# Patient Record
Sex: MALE | Race: BLACK OR AFRICAN AMERICAN
[De-identification: names, ages, dates, MRNs, and addresses within clinical notes are randomized per-mention and may not be internally consistent; named-entity substitution may affect disease eponyms.]

---

## 2020-05-16 ENCOUNTER — HOSPITAL ENCOUNTER (INPATIENT)
Dept: HOSPITAL 72 - EMR | Age: 80
LOS: 6 days | Discharge: OTHER INSTITUTION | DRG: 871 | End: 2020-05-22
Payer: MEDICARE

## 2020-05-16 VITALS — DIASTOLIC BLOOD PRESSURE: 100 MMHG | SYSTOLIC BLOOD PRESSURE: 178 MMHG

## 2020-05-16 VITALS — HEIGHT: 69 IN | WEIGHT: 216.01 LBS | BODY MASS INDEX: 31.99 KG/M2

## 2020-05-16 VITALS — DIASTOLIC BLOOD PRESSURE: 88 MMHG | SYSTOLIC BLOOD PRESSURE: 160 MMHG

## 2020-05-16 DIAGNOSIS — J44.9: ICD-10-CM

## 2020-05-16 DIAGNOSIS — J12.89: ICD-10-CM

## 2020-05-16 DIAGNOSIS — I50.30: ICD-10-CM

## 2020-05-16 DIAGNOSIS — R13.10: ICD-10-CM

## 2020-05-16 DIAGNOSIS — I21.4: ICD-10-CM

## 2020-05-16 DIAGNOSIS — N39.0: ICD-10-CM

## 2020-05-16 DIAGNOSIS — R74.0: ICD-10-CM

## 2020-05-16 DIAGNOSIS — U07.1: ICD-10-CM

## 2020-05-16 DIAGNOSIS — R65.20: ICD-10-CM

## 2020-05-16 DIAGNOSIS — I16.0: ICD-10-CM

## 2020-05-16 DIAGNOSIS — I69.351: ICD-10-CM

## 2020-05-16 DIAGNOSIS — E78.5: ICD-10-CM

## 2020-05-16 DIAGNOSIS — Z79.82: ICD-10-CM

## 2020-05-16 DIAGNOSIS — I48.91: ICD-10-CM

## 2020-05-16 DIAGNOSIS — I27.20: ICD-10-CM

## 2020-05-16 DIAGNOSIS — Z66: ICD-10-CM

## 2020-05-16 DIAGNOSIS — D69.6: ICD-10-CM

## 2020-05-16 DIAGNOSIS — I13.0: ICD-10-CM

## 2020-05-16 DIAGNOSIS — N17.9: ICD-10-CM

## 2020-05-16 DIAGNOSIS — B96.89: ICD-10-CM

## 2020-05-16 DIAGNOSIS — J96.01: ICD-10-CM

## 2020-05-16 DIAGNOSIS — N18.9: ICD-10-CM

## 2020-05-16 DIAGNOSIS — A41.89: Primary | ICD-10-CM

## 2020-05-16 LAB
ADD MANUAL DIFF: NO
ALBUMIN SERPL-MCNC: 2.8 G/DL (ref 3.4–5)
ALBUMIN/GLOB SERPL: 0.6 {RATIO} (ref 1–2.7)
ALP SERPL-CCNC: 106 U/L (ref 46–116)
ALT SERPL-CCNC: 50 U/L (ref 12–78)
ANION GAP SERPL CALC-SCNC: 13 MMOL/L (ref 5–15)
APPEARANCE UR: (no result)
APTT BLD: 36 SEC (ref 23–33)
APTT PPP: YELLOW S
AST SERPL-CCNC: 60 U/L (ref 15–37)
BASOPHILS NFR BLD AUTO: 0.6 % (ref 0–2)
BILIRUB DIRECT SERPL-MCNC: 1.1 MG/DL (ref 0–0.3)
BILIRUB SERPL-MCNC: 1.9 MG/DL (ref 0.2–1)
BUN SERPL-MCNC: 48 MG/DL (ref 7–18)
CALCIUM SERPL-MCNC: 8.6 MG/DL (ref 8.5–10.1)
CHLORIDE SERPL-SCNC: 108 MMOL/L (ref 98–107)
CK SERPL-CCNC: 139 U/L (ref 26–308)
CO2 SERPL-SCNC: 25 MMOL/L (ref 21–32)
CREAT SERPL-MCNC: 2 MG/DL (ref 0.55–1.3)
EOSINOPHIL NFR BLD AUTO: 0 % (ref 0–3)
ERYTHROCYTE [DISTWIDTH] IN BLOOD BY AUTOMATED COUNT: 12.6 % (ref 11.6–14.8)
GLOBULIN SER-MCNC: 4.7 G/DL
GLUCOSE UR STRIP-MCNC: NEGATIVE MG/DL
HCT VFR BLD CALC: 42.1 % (ref 42–52)
HGB BLD-MCNC: 13.6 G/DL (ref 14.2–18)
INR PPP: 1.2 (ref 0.9–1.1)
KETONES UR QL STRIP: NEGATIVE
LDH SERPL L TO P-CCNC: 458 U/L (ref 81–234)
LEUKOCYTE ESTERASE UR QL STRIP: (no result)
LYMPHOCYTES NFR BLD AUTO: 10.3 % (ref 20–45)
MCV RBC AUTO: 99 FL (ref 80–99)
MONOCYTES NFR BLD AUTO: 7.7 % (ref 1–10)
NEUTROPHILS NFR BLD AUTO: 81.5 % (ref 45–75)
NITRITE UR QL STRIP: NEGATIVE
PH UR STRIP: 5 [PH] (ref 4.5–8)
PLATELET # BLD: 124 K/UL (ref 150–450)
POTASSIUM SERPL-SCNC: 3.9 MMOL/L (ref 3.5–5.1)
PROT UR QL STRIP: (no result)
RBC # BLD AUTO: 4.26 M/UL (ref 4.7–6.1)
SODIUM SERPL-SCNC: 146 MMOL/L (ref 136–145)
SP GR UR STRIP: 1.01 (ref 1–1.03)
UROBILINOGEN UR-MCNC: 1 MG/DL (ref 0–1)
WBC # BLD AUTO: 6.3 K/UL (ref 4.8–10.8)

## 2020-05-16 PROCEDURE — 84550 ASSAY OF BLOOD/URIC ACID: CPT

## 2020-05-16 PROCEDURE — 80053 COMPREHEN METABOLIC PANEL: CPT

## 2020-05-16 PROCEDURE — 84100 ASSAY OF PHOSPHORUS: CPT

## 2020-05-16 PROCEDURE — 85025 COMPLETE CBC W/AUTO DIFF WBC: CPT

## 2020-05-16 PROCEDURE — 87086 URINE CULTURE/COLONY COUNT: CPT

## 2020-05-16 PROCEDURE — 96361 HYDRATE IV INFUSION ADD-ON: CPT

## 2020-05-16 PROCEDURE — 96367 TX/PROPH/DG ADDL SEQ IV INF: CPT

## 2020-05-16 PROCEDURE — 84443 ASSAY THYROID STIM HORMONE: CPT

## 2020-05-16 PROCEDURE — 99285 EMERGENCY DEPT VISIT HI MDM: CPT

## 2020-05-16 PROCEDURE — 85730 THROMBOPLASTIN TIME PARTIAL: CPT

## 2020-05-16 PROCEDURE — 83735 ASSAY OF MAGNESIUM: CPT

## 2020-05-16 PROCEDURE — 85379 FIBRIN DEGRADATION QUANT: CPT

## 2020-05-16 PROCEDURE — 85610 PROTHROMBIN TIME: CPT

## 2020-05-16 PROCEDURE — 87040 BLOOD CULTURE FOR BACTERIA: CPT

## 2020-05-16 PROCEDURE — 86140 C-REACTIVE PROTEIN: CPT

## 2020-05-16 PROCEDURE — 83615 LACTATE (LD) (LDH) ENZYME: CPT

## 2020-05-16 PROCEDURE — 82607 VITAMIN B-12: CPT

## 2020-05-16 PROCEDURE — 82728 ASSAY OF FERRITIN: CPT

## 2020-05-16 PROCEDURE — 96365 THER/PROPH/DIAG IV INF INIT: CPT

## 2020-05-16 PROCEDURE — 82248 BILIRUBIN DIRECT: CPT

## 2020-05-16 PROCEDURE — 83605 ASSAY OF LACTIC ACID: CPT

## 2020-05-16 PROCEDURE — 83880 ASSAY OF NATRIURETIC PEPTIDE: CPT

## 2020-05-16 PROCEDURE — 83550 IRON BINDING TEST: CPT

## 2020-05-16 PROCEDURE — 36415 COLL VENOUS BLD VENIPUNCTURE: CPT

## 2020-05-16 PROCEDURE — 80048 BASIC METABOLIC PNL TOTAL CA: CPT

## 2020-05-16 PROCEDURE — 93306 TTE W/DOPPLER COMPLETE: CPT

## 2020-05-16 PROCEDURE — 84484 ASSAY OF TROPONIN QUANT: CPT

## 2020-05-16 PROCEDURE — 80061 LIPID PANEL: CPT

## 2020-05-16 PROCEDURE — 83540 ASSAY OF IRON: CPT

## 2020-05-16 PROCEDURE — 83690 ASSAY OF LIPASE: CPT

## 2020-05-16 PROCEDURE — 82746 ASSAY OF FOLIC ACID SERUM: CPT

## 2020-05-16 PROCEDURE — 82550 ASSAY OF CK (CPK): CPT

## 2020-05-16 PROCEDURE — 80162 ASSAY OF DIGOXIN TOTAL: CPT

## 2020-05-16 PROCEDURE — 87181 SC STD AGAR DILUTION PER AGT: CPT

## 2020-05-16 PROCEDURE — 81003 URINALYSIS AUTO W/O SCOPE: CPT

## 2020-05-16 PROCEDURE — 71045 X-RAY EXAM CHEST 1 VIEW: CPT

## 2020-05-16 PROCEDURE — 87635 SARS-COV-2 COVID-19 AMP PRB: CPT

## 2020-05-16 PROCEDURE — 84300 ASSAY OF URINE SODIUM: CPT

## 2020-05-16 PROCEDURE — 82977 ASSAY OF GGT: CPT

## 2020-05-16 PROCEDURE — 83036 HEMOGLOBIN GLYCOSYLATED A1C: CPT

## 2020-05-16 PROCEDURE — 93005 ELECTROCARDIOGRAM TRACING: CPT

## 2020-05-16 PROCEDURE — 87081 CULTURE SCREEN ONLY: CPT

## 2020-05-16 NOTE — NUR
ED Nurse Note:



IV line established, blood drawn and sent to lab. Urine also obtained and sent 
to lab.

## 2020-05-16 NOTE — EMERGENCY ROOM REPORT
History of Present Illness


General


Chief Complaint:  Altered Mental Status


Source:  Patient, EMS





Present Illness


HPI


Patient presents from skilled nursing facility with a cough and shortness of 

breath.  He has a history of COPD and pneumonia in the past.  He denies any 

chest pain but says he is short of breath.  He was transported by S.  The 

patient is on oxygen at this time.  He denies having cough but is heard to be 

coughing.  He has had some chills according to him.  There is no documented 

fever.  The patient's had admissions in the past for pneumonia and COPD.  

Unknown of his COVID-19 status at this time.





No chills, sore throat, chest pain, palpitations, nausea, vomiting, diarrhea, 

dysuria, abdominal pain, joint pain, rashes, visual changes, dizziness, 

headache.


Allergies:  


Coded Allergies:  


     No Known Allergies (Unverified , 5/16/20)





COVID-19 Screening


Contact w/high risk pt:  Yes


Recent Travel to affected area:  No


Experienced COVID-19 symptoms?:  Yes


COVID-19 symptoms experienced:  Fever (T>100.4F or >38C), Cough


COVID-19 Testing performed PTA:  No


COVID-19 Screening:  [facility] COVID-19





Patient History


Past Medical History:  see triage record


Social History:  Denies: smoking - prior


Social History Narrative


[facility]


Reviewed Nursing Documentation:  PMH: Agreed; PSxH: Agreed





Nursing Documentation-PMH


Hx Hypertension:  Yes - HDL, dysphagia


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





Review of Systems


All Other Systems:  negative except mentioned in HPI





Physical Exam





Vital Signs








  Date Time  Temp Pulse Resp B/P (MAP) Pulse Ox O2 Delivery O2 Flow Rate FiO2


 


5/16/20 20:59  90 40 178/100 (126) 97 Nasal Cannula 4.0 








Sp02 EP Interpretation:  reviewed, abnormal - Interpreted as low by me


General Appearance:  alert, Chronically Ill


Eyes:  bilateral eye normal inspection, bilateral eye PERRL, bilateral eye 

other - Bilaterally


ENT:  moist mucus membranes


Neck:  supple


Respiratory:  no respiratory distress, rhonchi, other - Tachypnea


Cardiovascular #1:  regular rate, rhythm, no edema


Cardiovascular #2:  2+ radial (L)


Gastrointestinal:  non tender, soft


Genitourinary:  no CVA tenderness


Musculoskeletal:  decreased range of motion - Right hand, swelling - Bilateral 

feet, other - Right hand contractures


Neurologic:  alert, motor weakness, Babinski - Right hemiparesis hemiparesis 

bilateral


Psychiatric:  mood/affect normal


Skin:  no rash





Medical Decision Making


Diagnostic Impression:  


 Primary Impression:  


 Right lower lobe pneumonia


 Qualified Codes:  J18.9 - Pneumonia, unspecified organism


 Additional Impressions:  


 Suspected COVID-19 virus infection


 Hypoxia


 Renal insufficiency


 NSTEMI (non-ST elevated myocardial infarction)


ER Course


Patient presents with fever, cough and tachypnea.  Differential includes COVID-

19, pneumonia, exacerbation COPD, acute myocardial infarction amongst others.  

Evaluation with EKG, chest x-ray and labs including septic work-up.  Treatment 

with IV hydration, oxygen.  Respiratory isolation instituted.





EKG with LVH and QT prolongation.  Chest x-ray right lower lobe infiltrate.  

White count normal.  Left shift.  Renal insufficiency.  Elevated CRP, ferritin, 

LDH.  Normal lactic acid





Based on chest x-ray antibiotics (triple) begun.  Tylenol administered for 

fever.





Chest x-ray more consistent with lobar pneumonia.  However concern over 

elevated ferritin, CRP and LDH which suggest possible COVID-19.





Patient improved on oxygen and with IV hydration.





Discussed with Dr. Beckman, [place], states does not want patient transferred.

  Contact Dr. Brown and Dr. Manzano for admission.





Troponin not discussed with ER physician.





Patient improved and admitted to the hospital.





Laboratory Tests








Test


  5/16/20


21:25 5/16/20


21:40


 


White Blood Count


  6.3 K/UL


(4.8-10.8) 


 


 


Red Blood Count


  4.26 M/UL


(4.70-6.10)  L 


 


 


Hemoglobin


  13.6 G/DL


(14.2-18.0)  L 


 


 


Hematocrit


  42.1 %


(42.0-52.0) 


 


 


Mean Corpuscular Volume 99 FL (80-99)   


 


Mean Corpuscular Hemoglobin


  31.9 PG


(27.0-31.0)  H 


 


 


Mean Corpuscular Hemoglobin


Concent 32.3 G/DL


(32.0-36.0) 


 


 


Red Cell Distribution Width


  12.6 %


(11.6-14.8) 


 


 


Platelet Count


  124 K/UL


(150-450)  L 


 


 


Mean Platelet Volume


  7.8 FL


(6.5-10.1) 


 


 


Neutrophils (%) (Auto)


  81.5 %


(45.0-75.0)  H 


 


 


Lymphocytes (%) (Auto)


  10.3 %


(20.0-45.0)  L 


 


 


Monocytes (%) (Auto)


  7.7 %


(1.0-10.0) 


 


 


Eosinophils (%) (Auto)


  0.0 %


(0.0-3.0) 


 


 


Basophils (%) (Auto)


  0.6 %


(0.0-2.0) 


 


 


Prothrombin Time


  12.7 SEC


(9.30-11.50)  H 


 


 


Prothrombin Time INR


  1.2 (0.9-1.1)


H 


 


 


Activated Partial


Thromboplast Time 36 SEC (23-33)


H 


 


 


D-Dimer


  2.57 mg/L FEU


(0.00-0.49)  H 


 


 


Sodium Level


  146 MMOL/L


(136-145)  H 


 


 


Potassium Level


  3.9 MMOL/L


(3.5-5.1) 


 


 


Chloride Level


  108 MMOL/L


()  H 


 


 


Carbon Dioxide Level


  25 MMOL/L


(21-32) 


 


 


Anion Gap


  13 mmol/L


(5-15) 


 


 


Blood Urea Nitrogen


  48 mg/dL


(7-18)  H 


 


 


Creatinine


  2.0 MG/DL


(0.55-1.30)  H 


 


 


Estimated Glomerular


Filtration Rate 39.1 mL/min


(>60) 


 


 


Glucose Level


  100 MG/DL


() 


 


 


Lactic Acid Level


  0.90 mmol/L


(0.4-2.0) 


 


 


Calcium Level


  8.6 MG/DL


(8.5-10.1) 


 


 


Magnesium Level


  2.2 MG/DL


(1.8-2.4) 


 


 


Ferritin


  1398 NG/ML


(8-388)  H 


 


 


Total Bilirubin


  1.9 MG/DL


(0.2-1.0)  H 


 


 


Direct Bilirubin


  1.1 MG/DL


(0.0-0.3)  H 


 


 


Aspartate Amino Transferase


(AST) 60 U/L (15-37)


H 


 


 


Alanine Aminotransferase (ALT)


  50 U/L (12-78)


  


 


 


Alkaline Phosphatase


  106 U/L


() 


 


 


Lactate Dehydrogenase


  458 U/L


()  H 


 


 


Total Creatine Kinase


  139 U/L


() 


 


 


Troponin I


  0.151 ng/mL


(0.000-0.056) 


 


 


C-Reactive Protein,


Quantitative 27.8 mg/dL


(0.00-0.90)  H 


 


 


Pro-B-Type Natriuretic Peptide


  4086 pg/mL


(0-125)  H 


 


 


Total Protein


  7.5 G/DL


(6.4-8.2) 


 


 


Albumin


  2.8 G/DL


(3.4-5.0)  L 


 


 


Globulin 4.7 g/dL   


 


Albumin/Globulin Ratio


  0.6 (1.0-2.7)


L 


 


 


Lipase


  256 U/L


() 


 


 


Urine Color  Yellow  


 


Urine Appearance  Cloudy  


 


Urine pH  5 (4.5-8.0)  


 


Urine Specific Gravity


  


  1.015


(1.005-1.035)


 


Urine Protein


  


  3+ (NEGATIVE)


H


 


Urine Glucose (UA)


  


  Negative


(NEGATIVE)


 


Urine Ketones


  


  Negative


(NEGATIVE)


 


Urine Blood


  


  3+ (NEGATIVE)


H


 


Urine Nitrite


  


  Negative


(NEGATIVE)


 


Urine Bilirubin


  


  Negative


(NEGATIVE)


 


Urine Urobilinogen


  


  1 MG/DL


(0.0-1.0)  H


 


Urine Leukocyte Esterase


  


  2+ (NEGATIVE)


H


 


Urine RBC


  


  40-60 /HPF (0


- 0)  H


 


Urine WBC


  


  30-40 /HPF (0


- 0)  H


 


Urine Squamous Epithelial


Cells 


  Few /LPF


(NONE/OCC)


 


Urine Amorphous Sediment


  


  Many /LPF


(NONE)  H


 


Urine Bacteria


  


  Many /HPF


(NONE)  H








EKG Diagnostic Results


Rate:  normal


Rhythm:  NSR


ST Segments:  no acute changes - LVH and QT prolongation





Rhythm Strip Diag. Results


EP Interpretation:  yes


Rhythm:  NSR, no PVC's, no ectopy





Chest X-Ray Diagnostic Results


Chest X-Ray Diagnostic Results :  


   Chest X-Ray Ordered:  Yes


   # of Views/Limited/Complete:  1 View


   Indication:  Shortness of Breath


   EP Interpretation:  Yes


   Interpretation:  no effusion, no pneumothorax, other - CXR


   Impression:  Other


   Electronically Signed by:  Electronically signed by Kishore Breaux MD





Last Vital Signs








  Date Time  Temp Pulse Resp B/P (MAP) Pulse Ox O2 Delivery O2 Flow Rate FiO2


 


5/17/20 00:30 102.1 78 27 146/74 99 Nasal Cannula 4.0 








Status:  improved


Disposition:  ADMITTED AS INPATIENT


Condition:  Serious


Referrals:  


Carlo Manzano DO (PCP)











Kishore Breaux MD May 16, 2020 22:09

## 2020-05-16 NOTE — NUR
ED Nurse Note:



Pt is sleeping in bed at this time. No acute distress. Pt respirations have 
decreased to 24.

## 2020-05-16 NOTE — NUR
ED Nurse Note:



Pt brought into ED by [facility] ambulance unit 240 from [facility] 
for c/o increased lethargy. Per EMS, pt has been more lethargic than normal and 
this was noticed by staff yesterday. Pt is awake and alert, slow to speak but 
able to answer simple questions. Pt is tachypneic at this time with 
respirations in the 30's. Pt is on 4L oxygen via [place]. Pt is hot to the touch and 
fever noted upon triage. Productive cough also noted upon triage. Pt connected 
to cardiac monitor, ERMD bedside. Safety measures in place, will continue to 
monitor.

## 2020-05-16 NOTE — DIAGNOSTIC IMAGING REPORT
EXAM:

  XR Chest, 1 View

 

CLINICAL HISTORY:

  COUGH

 

TECHNIQUE:

  Frontal view of the chest.

 

COMPARISON:

  No relevant prior studies available.

 

FINDINGS:

  Lungs:  There is consolidation involving the right lower lung 

consistent with pneumonia.  Linear atelectasis is identified at the left 

lung base.

  Pleural space:  Unremarkable.  No pneumothorax.

  Heart:  Cardiac size is enlarged.

  Mediastinum:  Unremarkable.

  Bones/joints:  There are degenerative arthritic changes of the right 

shoulder joint.

  Vasculature:  The thoracic aorta is calcified and ectatic.

 

IMPRESSION:     

1.  Right lower lung pneumonia.

2.  Cardiomegaly.

## 2020-05-17 VITALS — SYSTOLIC BLOOD PRESSURE: 149 MMHG | DIASTOLIC BLOOD PRESSURE: 77 MMHG

## 2020-05-17 VITALS — SYSTOLIC BLOOD PRESSURE: 151 MMHG | DIASTOLIC BLOOD PRESSURE: 68 MMHG

## 2020-05-17 VITALS — DIASTOLIC BLOOD PRESSURE: 72 MMHG | SYSTOLIC BLOOD PRESSURE: 149 MMHG

## 2020-05-17 VITALS — DIASTOLIC BLOOD PRESSURE: 80 MMHG | SYSTOLIC BLOOD PRESSURE: 155 MMHG

## 2020-05-17 VITALS — SYSTOLIC BLOOD PRESSURE: 151 MMHG | DIASTOLIC BLOOD PRESSURE: 82 MMHG

## 2020-05-17 VITALS — SYSTOLIC BLOOD PRESSURE: 157 MMHG | DIASTOLIC BLOOD PRESSURE: 86 MMHG

## 2020-05-17 VITALS — DIASTOLIC BLOOD PRESSURE: 78 MMHG | SYSTOLIC BLOOD PRESSURE: 151 MMHG

## 2020-05-17 LAB
ADD MANUAL DIFF: NO
ALBUMIN SERPL-MCNC: 2.6 G/DL (ref 3.4–5)
ALBUMIN/GLOB SERPL: 0.6 {RATIO} (ref 1–2.7)
ALP SERPL-CCNC: 108 U/L (ref 46–116)
ALT SERPL-CCNC: 59 U/L (ref 12–78)
ANION GAP SERPL CALC-SCNC: 12 MMOL/L (ref 5–15)
AST SERPL-CCNC: 80 U/L (ref 15–37)
BASOPHILS NFR BLD AUTO: 0.4 % (ref 0–2)
BILIRUB DIRECT SERPL-MCNC: 1.1 MG/DL (ref 0–0.3)
BILIRUB SERPL-MCNC: 1.8 MG/DL (ref 0.2–1)
BUN SERPL-MCNC: 46 MG/DL (ref 7–18)
CALCIUM SERPL-MCNC: 8.6 MG/DL (ref 8.5–10.1)
CHLORIDE SERPL-SCNC: 110 MMOL/L (ref 98–107)
CO2 SERPL-SCNC: 25 MMOL/L (ref 21–32)
CREAT SERPL-MCNC: 1.9 MG/DL (ref 0.55–1.3)
EOSINOPHIL NFR BLD AUTO: 0 % (ref 0–3)
ERYTHROCYTE [DISTWIDTH] IN BLOOD BY AUTOMATED COUNT: 11.3 % (ref 11.6–14.8)
GLOBULIN SER-MCNC: 4.6 G/DL
HCT VFR BLD CALC: 42.6 % (ref 42–52)
HGB BLD-MCNC: 14.8 G/DL (ref 14.2–18)
LYMPHOCYTES NFR BLD AUTO: 9.2 % (ref 20–45)
MCV RBC AUTO: 92 FL (ref 80–99)
MONOCYTES NFR BLD AUTO: 6.6 % (ref 1–10)
NEUTROPHILS NFR BLD AUTO: 83.8 % (ref 45–75)
PHOSPHATE SERPL-MCNC: 3.3 MG/DL (ref 2.5–4.9)
PLATELET # BLD: 121 K/UL (ref 150–450)
POTASSIUM SERPL-SCNC: 3.7 MMOL/L (ref 3.5–5.1)
RBC # BLD AUTO: 4.62 M/UL (ref 4.7–6.1)
SODIUM SERPL-SCNC: 147 MMOL/L (ref 136–145)
WBC # BLD AUTO: 5.7 K/UL (ref 4.8–10.8)

## 2020-05-17 RX ADMIN — DOCUSATE SODIUM SCH MG: 250 CAPSULE, LIQUID FILLED ORAL at 09:19

## 2020-05-17 RX ADMIN — HYDRALAZINE HYDROCHLORIDE SCH MG: 25 TABLET ORAL at 14:25

## 2020-05-17 RX ADMIN — SORBITOL SOLUTION (BULK) SCH ML: 70 SOLUTION at 17:35

## 2020-05-17 RX ADMIN — DILTIAZEM HYDROCHLORIDE SCH MG: 180 CAPSULE, EXTENDED RELEASE ORAL at 09:19

## 2020-05-17 RX ADMIN — DEXTROSE, SODIUM CHLORIDE, AND POTASSIUM CHLORIDE SCH MLS/HR: 5; .45; .15 INJECTION INTRAVENOUS at 14:24

## 2020-05-17 RX ADMIN — Medication SCH EA: at 09:19

## 2020-05-17 RX ADMIN — DIGOXIN SCH MG: 0.12 TABLET ORAL at 09:19

## 2020-05-17 RX ADMIN — ASPIRIN SCH MG: 81 TABLET, DELAYED RELEASE ORAL at 09:19

## 2020-05-17 RX ADMIN — HYDRALAZINE HYDROCHLORIDE SCH MG: 25 TABLET ORAL at 06:23

## 2020-05-17 RX ADMIN — SORBITOL SOLUTION (BULK) SCH ML: 70 SOLUTION at 12:21

## 2020-05-17 RX ADMIN — DEXTROSE, SODIUM CHLORIDE, AND POTASSIUM CHLORIDE SCH MLS/HR: 5; .45; .15 INJECTION INTRAVENOUS at 03:35

## 2020-05-17 RX ADMIN — ATORVASTATIN CALCIUM SCH MG: 20 TABLET, FILM COATED ORAL at 23:29

## 2020-05-17 RX ADMIN — AZITHROMYCIN DIHYDRATE SCH MLS/HR: 500 INJECTION, POWDER, LYOPHILIZED, FOR SOLUTION INTRAVENOUS at 21:00

## 2020-05-17 RX ADMIN — DOCUSATE SODIUM SCH MG: 250 CAPSULE, LIQUID FILLED ORAL at 17:35

## 2020-05-17 RX ADMIN — HYDRALAZINE HYDROCHLORIDE SCH MG: 25 TABLET ORAL at 23:28

## 2020-05-17 NOTE — NUR
NURSE NOTES:

Asked AURE Bergman if it is okay to administer Lovenox because of low platelet count of 124. 
Per AURE Bergman, it is okay to administer.

## 2020-05-17 NOTE — CONSULTATION
DATE OF CONSULTATION:  05/17/2020

CARDIOLOGY CONSULTATION



CONSULTING PHYSICIAN:  Jake Lan MD.



REFERRING PHYSICIAN:  Urban Brown MD.



REASON FOR CONSULTATION:  Accelerated hypertension, shortness of breath,

and atrial fibrillation.



HISTORY OF PRESENT ILLNESS:  The patient is an 80-year-old gentleman with

history of hypertension, COPD, and history of CVA in the past was brought

by paramedics for cough and shortness of breath.  The patient has history

of COPD and pneumonia in the past.  The patient also had some chills.  The

patient was admitted and be ruled out for COVID pneumonia.  At the time of

my evaluation, the patient is comfortable and is on oxygen.



REVIEW OF SYSTEMS:  Review of systems was negative other than what is

mentioned in the history of present illness.



PAST MEDICAL HISTORY:  As mentioned above.



FAMILY HISTORY:  Noncontributory.



SOCIAL HISTORY:  There is no history of smoking or drug use.



PHYSICAL EXAMINATION:

VITAL SIGNS:  Blood pressure is 149/77, pulse 70, respirations 18, and he

is afebrile.

HEAD AND NECK:  Showed no JVD.

LUNGS:  Coarse rhonchi.

CARDIOVASCULAR:  Regular S1 and S2 with no gallop.

ABDOMEN:  Soft.

EXTREMITIES:  No pitting edema.



LABORATORY DATA:  Labs show white count 5.7, hemoglobin 14.9, hematocrit

42.6, and platelet count 121.  Sodium 147, potassium 3.7, BUN of 43,

creatinine 1.9, and glucose of 147.  Troponin is 0.15 and 0.18.



ASSESSMENT AND PLAN:

1. Non-ST elevation myocardial infarction by elevated troponin.

However, this could be due to renal failure, creatinine is 2.  The EKG

showed atrial fibrillation, occasional PVC. The patient on aspirin and

Lipitor, add metoprolol to his medical regimen.  The patient is also on

Imdur.  Repeat EKG and echocardiogram for further evaluation.

2. Atrial fibrillation.  The rate is currently controlled on Cardizem CD

180 mg daily.  Currently, patient is only on aspirin and low-dose

subcutaneous Lovenox.  Most likely would need a full anticoagulation that

would be discussed with Dr. Brown.  The patient is also on digoxin as

well.

3. Hypertension, stable on our Cardizem.

4. COPD on albuterol.

5. Occasional PVCs.  Echocardiogram shows ejection fraction of 65%.  I

will repeat troponin in the morning as well.

6. Rule out COVID pneumonia.

7. Renal failure, creatinine of 2.



Thank you very much for allowing me to participate in the care of this

patient.  Please do not hesitate to contact me for any questions regarding

my evaluation.



Sincerely,









  ______________________________________________

  Jake Lan M.D.





DR:  Tabitha

D:  05/17/2020 14:11

T:  05/17/2020 19:13

JOB#:  [id number]

CC:

## 2020-05-17 NOTE — NUR
NURSE NOTES:

Received report from LISA Alston. Patient transferred from E. via [facility] to room 216-2 on 
Covid precaution. Patient is awake, alert and oriented x 2. Oriented to room and telemetry 
unit. On nasal cannula @ 4Lpm with no shortness or difficulty of breathing reported, 
saturating 94%. On low sodium diet. Cardiac monitor is in placed, shows Atrial fibrillation. 
Patient is bedbound. IV site is on left forearm g-20 saline lock that is patent and intact. 
Safety measures are in placed, bed in lowest and lock position. Side rails up x 2. Will 
continue plan of care,

## 2020-05-17 NOTE — HISTORY & PHYSICAL
History and Physical


History & Physicial


Job # [id number]











Urban Brown MD May 17, 2020 13:19

## 2020-05-17 NOTE — CONSULTATION
Consult Note


Consult Note





I am asked to evaluate the the patient at the request of Dr. Brown for renal 

failure





Patient presents from [facility] nursing facility with a cough and shortness of 

breath.  He has a history of COPD and pneumonia in the past.  He denies any 

chest pain but says he is short of breath.  He was transported by S.  The 

patient is on oxygen at this time.  He denies having cough but is heard to be 

coughing.  He has had some chills according to him.  There is no documented 

fever.  The patient's had admissions in the past for pneumonia and COPD.  

Unknown of his COVID-19 status at this time.





No chills, sore throat, chest pain, palpitations, nausea, vomiting, diarrhea, 

dysuria, abdominal pain, joint pain, rashes, visual changes, dizziness, 

headache.





     No Known Allergies (Unverified , 5/16/20)





COVID-19 Screening


Contact w/high risk pt:  Yes


Recent Travel to affected area:  No


Experienced COVID-19 symptoms?:  Yes


COVID-19 symptoms experienced:  Fever (T>100.4F or >38C), Cough


COVID-19 Testing performed PTA:  No


COVID-19 Screening:  [facility] COVID-19








Hx Hypertension:  Yes - HDL, dysphagia


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





Patient seen, examined


Data reviewed


No prior hospitalization here at [facility]


Assessment/Plan





Acute renal failure most likely superimposed on underlying chronic kidney 

disease


Acute hypoxic respiratory failure, pneumonia, suspected COVID-19


Sepsis


Elevated troponin


Suspected congestive heart failure


Hypertension, hypertension urgency on admission


Evidence of UTI








Suggestions:


Slow hydration


Watch for CHF symptoms


Pulmonary toilet


Antibiotics


Urine studies


Keep the blood pressure and blood sugar in check


Monitor renal parameters


Per orders


Per consultants











Shane Mello MD May 17, 2020 11:00

## 2020-05-17 NOTE — NUR
HAND-OFF: 

Report given to LISA Matos. Patient is asleep, on stable condition. Plan of care endorsed.

## 2020-05-17 NOTE — CONSULTATION
History of Present Illness


General


Date patient seen:  May 17, 2020


Time patient seen:  06:30


Chief Complaint:  Altered Mental Status


Referring physician:  Dr Brown


Reason for Consultation:  PNA





Present Illness


HPI


80 years old male, resident of skilled nursing facility, with past medical 

history of COPD, hypertension, CVA with right-sided hemiparesis, prior pneumonia

, DNR/DNI status, was sent for evaluation due to shortness of breath, cough and 

chills.


No fever was reported.


Upon evaluation patient was febrile with  temperature 102 and hypoxic requiring 

high flow of supplemental oxygen.


Blood pressure was elevated 178/100, and patient was tachypneic with 

respiratory rate of 40.


Laboratory work-up revealed no leukocytosis, stable hemoglobin and hematocrit.  


Platelet count 124.  


Troponin elevated 0.151.  pro BNP 4086.  EKG revealed   sinus rhythm, no acute 

ischemic changes.


Lactic acid 0.9.


BUN 42, creatinine 2.0.


Sodium 146.


Total bilirubin 1.9, direct bilirubin 1.1.  AST 60.


Urinalysis revealed pyuria, many bacteria, +3 protein, +2 leukocyte esterase.  


Noted elevated CRP 27.8 , , ferritin 1398, d-dimer 2.57 , thus  all 

inflammatory markers  elevated suggestive of possible cytokine storm.  


Patient was tested for COVID-19. 


Chest x-ray demonstrated right lower lung pneumonia.  Cardiomegaly.  


In emergency department patient pancultured ,started on empiric antibiotics,

received analgesic for fever.


Patient subsequently admitted to telemetry floor to isolation room for further 

management.


Allergies:  


Coded Allergies:  


     No Known Allergies (Unverified , 5/16/20)





Medication History


Scheduled


Acetaminophen (Acetaminophen), 325 MG ORAL Q4H, (Reported)


Amino Acids/Protein Hydrolys (Pro-Stat Liquid), 30 ML ORAL TWICE A DAY, (

Reported)


Aspirin* (Aspir 81*), 81 MG ORAL DAILY, (Reported)


Atorvastatin Calcium* (Lipitor*), 20 MG ORAL BEDTIME, (Reported)


Clonidine Hcl* (Catapres*), 0.1 MG ORAL EVERY 4 HOURS, (Reported)


Digoxin* (Digoxin*), 0.125 MG ORAL DAILY, (Reported)


Diltiazem Hcl* (Cardizem*), 180 MG ORAL DAILY, (Reported)


Docusate Sodium* (Docusate Sodium*), 250 MG ORAL TWICE A DAY, (Reported)


Hydralazine Hcl* (Hydralazine Hcl*), 25 MG ORAL EVERY 8 HOURS, (Reported)


Isosorbide Dinitrate* (Isordil*), 30 MG ORAL EVERY 6 HOURS, (Reported)


Multivitamin With Minerals (Multivitamins With Minerals*), 1 TAB ORAL DAILY, (

Reported)


Sorbitol Solution (Sorbitol), 30 ML ORAL EVERY 6 HOURS, (Reported)





Miscellaneous Medications


Bisacodyl (Dulcolax), 10 MG RC, (Reported)


Chlorhexidine Gluconate (Peridex), 10 ML MM, (Reported)


Dextran 70/Hypromellose (Artificial Tears Eye Drops*), 1 DROP BOTH EYES, (

Reported)


Mineral Oil (Mineral Oil Enema), 133 ML RC, (Reported)





Patient History


History Provided By:  Patient, Medical Record


Healthcare decision maker





Resuscitation status





Advanced Directive on File








Review of Systems


Constitutional:  Reports: weakness


Respiratory:  Reports: see HPI, cough


Cardiovascular:  Reports: no symptoms


Musculoskeletal:  Reports: joint pain


Neurological:  Reports: other - hx of CVA


ROS Narrative


ROS  limited due to patient medical conditiion





Physical Exam


General Appearance:  no apparent distress, other - awake, somewhat responsive 

AA male in NAD


Lines, tubes and drains:  peripheral


HEENT:  normocephalic, atraumatic, anicteric, mucous membranes moist


Neck:  supple


Respiratory/Chest:  no accessory muscle use, rhonchi  - left - few, rhonchi - 

right - diffused


Cardiovascular/Chest:  normal rate


Abdomen:  normal bowel sounds, non tender, soft


Extremities:  no calf tenderness, no edema


Neurologic:  abnormal gait, alert - somewhat responsive, , other - R side 

weakness


Musculoskeletal:  atrophy - BLE





Last 24 Hour Vital Signs








  Date Time  Temp Pulse Resp B/P (MAP) Pulse Ox O2 Delivery O2 Flow Rate FiO2


 


5/17/20 06:23    158/85    


 


5/17/20 06:23    158/88    


 


5/17/20 04:00 99.1 74 21 151/82 (105) 95   


 


5/17/20 00:45 99.5 71 22 157/86 (109) 94   


 


5/17/20 00:30 102.1 78 27 146/74 99 Nasal Cannula 4.0 


 


5/17/20 00:15      Nasal Cannula 4.0 


 


5/17/20 00:00 102.1 75 25 151/68 100 Nasal Cannula 4.0 


 


5/16/20 22:22 102.1       


 


5/16/20 21:45 104.1 80 32 160/88 97 Nasal Cannula 4.0 


 


5/16/20 21:00  90 40   Nasal Cannula 4.0 


 


5/16/20 21:00 102.0 90 40 178/100 97 Nasal Cannula 4.0 


 


5/16/20 20:59  90 40 178/100 (126) 97 Nasal Cannula 4.0 

















Intake and Output  


 


 5/16/20 5/17/20





 19:00 07:00


 


Intake Total  0 ml


 


Output Total  100 ml


 


Balance  -100 ml


 


  


 


Intake Oral  0 ml


 


Output Urine Total  100 ml











Laboratory Tests








Test


  5/16/20


21:25 5/16/20


21:40


 


White Blood Count


  6.3 K/UL


(4.8-10.8) 


 


 


Red Blood Count


  4.26 M/UL


(4.70-6.10)  L 


 


 


Hemoglobin


  13.6 G/DL


(14.2-18.0)  L 


 


 


Hematocrit


  42.1 %


(42.0-52.0) 


 


 


Mean Corpuscular Volume 99 FL (80-99)   


 


Mean Corpuscular Hemoglobin


  31.9 PG


(27.0-31.0)  H 


 


 


Mean Corpuscular Hemoglobin


Concent 32.3 G/DL


(32.0-36.0) 


 


 


Red Cell Distribution Width


  12.6 %


(11.6-14.8) 


 


 


Platelet Count


  124 K/UL


(150-450)  L 


 


 


Mean Platelet Volume


  7.8 FL


(6.5-10.1) 


 


 


Neutrophils (%) (Auto)


  81.5 %


(45.0-75.0)  H 


 


 


Lymphocytes (%) (Auto)


  10.3 %


(20.0-45.0)  L 


 


 


Monocytes (%) (Auto)


  7.7 %


(1.0-10.0) 


 


 


Eosinophils (%) (Auto)


  0.0 %


(0.0-3.0) 


 


 


Basophils (%) (Auto)


  0.6 %


(0.0-2.0) 


 


 


Prothrombin Time


  12.7 SEC


(9.30-11.50)  H 


 


 


Prothromb Time International


Ratio 1.2 (0.9-1.1)


H 


 


 


Activated Partial


Thromboplast Time 36 SEC (23-33)


H 


 


 


D-Dimer


  2.57 mg/L FEU


(0.00-0.49)  H 


 


 


Sodium Level


  146 MMOL/L


(136-145)  H 


 


 


Potassium Level


  3.9 MMOL/L


(3.5-5.1) 


 


 


Chloride Level


  108 MMOL/L


()  H 


 


 


Carbon Dioxide Level


  25 MMOL/L


(21-32) 


 


 


Anion Gap


  13 mmol/L


(5-15) 


 


 


Blood Urea Nitrogen


  48 mg/dL


(7-18)  H 


 


 


Creatinine


  2.0 MG/DL


(0.55-1.30)  H 


 


 


Estimat Glomerular Filtration


Rate 39.1 mL/min


(>60) 


 


 


Glucose Level


  100 MG/DL


() 


 


 


Lactic Acid Level


  0.90 mmol/L


(0.4-2.0) 


 


 


Calcium Level


  8.6 MG/DL


(8.5-10.1) 


 


 


Magnesium Level


  2.2 MG/DL


(1.8-2.4) 


 


 


Ferritin


  1398 NG/ML


(8-388)  H 


 


 


Total Bilirubin


  1.9 MG/DL


(0.2-1.0)  H 


 


 


Direct Bilirubin


  1.1 MG/DL


(0.0-0.3)  H 


 


 


Aspartate Amino Transf


(AST/SGOT) 60 U/L (15-37)


H 


 


 


Alanine Aminotransferase


(ALT/SGPT) 50 U/L (12-78)


  


 


 


Alkaline Phosphatase


  106 U/L


() 


 


 


Lactate Dehydrogenase


  458 U/L


()  H 


 


 


Total Creatine Kinase


  139 U/L


() 


 


 


Troponin I


  0.151 ng/mL


(0.000-0.056) 


 


 


C-Reactive Protein,


Quantitative 27.8 mg/dL


(0.00-0.90)  H 


 


 


Pro-B-Type Natriuretic Peptide


  4086 pg/mL


(0-125)  H 


 


 


Total Protein


  7.5 G/DL


(6.4-8.2) 


 


 


Albumin


  2.8 G/DL


(3.4-5.0)  L 


 


 


Globulin 4.7 g/dL   


 


Albumin/Globulin Ratio


  0.6 (1.0-2.7)


L 


 


 


Lipase


  256 U/L


() 


 


 


Urine Color  Yellow  


 


Urine Appearance  Cloudy  


 


Urine pH  5 (4.5-8.0)  


 


Urine Specific Gravity


  


  1.015


(1.005-1.035)


 


Urine Protein


  


  3+ (NEGATIVE)


H


 


Urine Glucose (UA)


  


  Negative


(NEGATIVE)


 


Urine Ketones


  


  Negative


(NEGATIVE)


 


Urine Blood


  


  3+ (NEGATIVE)


H


 


Urine Nitrite


  


  Negative


(NEGATIVE)


 


Urine Bilirubin


  


  Negative


(NEGATIVE)


 


Urine Urobilinogen


  


  1 MG/DL


(0.0-1.0)  H


 


Urine Leukocyte Esterase


  


  2+ (NEGATIVE)


H


 


Urine RBC


  


  40-60 /HPF (0


- 0)  H


 


Urine WBC


  


  30-40 /HPF (0


- 0)  H


 


Urine Squamous Epithelial


Cells 


  Few /LPF


(NONE/OCC)


 


Urine Amorphous Sediment


  


  Many /LPF


(NONE)  H


 


Urine Bacteria


  


  Many /HPF


(NONE)  H








Height (Feet):  5


Height (Inches):  9.00


Weight (Pounds):  160


Medications





Current Medications








 Medications


  (Trade)  Dose


 Ordered  Sig/Michael


 Route


 PRN Reason  Start Time


 Stop Time Status Last Admin


Dose Admin


 


 Acetaminophen


  (Tylenol)  325 mg  Q4H


 ORAL


   5/17/20 00:15


 6/16/20 00:14 UNV  


 


 


 Artificial Tears


  (Akwa-Tears)  1 drop  BID


 BOTH EYES


   5/17/20 09:00


 6/16/20 08:59   


 


 


 Aspirin


  (Ecotrin)  81 mg  DAILY


 ORAL


   5/17/20 09:00


 7/1/20 08:59   


 


 


 Atorvastatin


 Calcium


  (Lipitor)  20 mg  BEDTIME


 ORAL


   5/17/20 21:00


 8/15/20 20:59   


 


 


 Azithromycin 500


 mg/Dextrose  275 ml @ 


 275 mls/hr  Q24H


 IV


   5/18/20 00:00


 5/24/20 00:59   


 


 


 Bisacodyl


  (Dulcolax)  10 mg  PRN


 RECTAL


   5/17/20 00:15


 8/15/20 00:14   


 


 


 Cefepime HCl 1 gm/


 Dextrose  50 ml @ 


 100 mls/hr  EVERY 12  HOURS


 IVPB


   5/17/20 09:00


 5/24/20 08:59   


 


 


 Clonidine HCl


  (Catapres Tab)  0.1 mg  EVERY 4  HOURS


 ORAL


   5/17/20 01:00


 8/15/20 00:59 UNV  


 


 


 Dextrose


  (Dextrose 50%)  25 ml  Q30M  PRN


 IV


 Hypoglycemia  5/17/20 00:15


 8/15/20 00:14   


 


 


 Dextrose


  (Dextrose 50%)  50 ml  Q30M  PRN


 IV


 Hypoglycemia  5/17/20 00:15


 8/15/20 00:14   


 


 


 Dextrose/


 Electrolytes  1,000 ml @ 


 75 mls/hr  [id number]


 IV


   5/17/20 01:00


 6/16/20 00:59  5/17/20 03:35


 


 


 Digoxin


  (Lanoxin)  0.125 mg  DAILY


 ORAL


   5/17/20 09:00


 8/15/20 08:59   


 


 


 Diltiazem HCl


  (Cardizem CD)  180 mg  DAILY


 ORAL


   5/17/20 09:00


 6/16/20 08:59   


 


 


 Docusate Sodium


  (Colace)  250 mg  TWICE A  DAY


 ORAL


   5/17/20 09:00


 6/16/20 08:59   


 


 


 Enoxaparin Sodium


  (Lovenox)  30 mg  DAILY


 SUBQ


   5/17/20 09:00


 8/15/20 08:59   


 


 


 Hydralazine HCl


  (Apresoline)  25 mg  EVERY 8  HOURS


 ORAL


   5/17/20 06:00


 8/15/20 05:59  5/17/20 06:23


 


 


 Isosorbide


 Dinitrate


  (Isordil)  10 mg  Q6HR


 ORAL


   5/17/20 06:00


 6/16/20 05:59  5/17/20 06:23


 


 


 Mineral Oil


  (Fleet's Mineral


 Oil Enema)  133 ml  DAILYPRN  PRN


 RECTAL


 CONSTIPATION  5/17/20 05:58


 6/16/20 05:57   


 


 


 Multivitamins


 Therapeutic


  (Therapeutic


 Multivitamin)  1 ea  DAILY


 ORAL


   5/17/20 09:00


 6/16/20 08:59   


 


 


 Sorbitol


  (sorbitoL)  30 ml  EVERY 6  HOURS


 ORAL


   5/17/20 12:00


 6/16/20 11:59   


 











Assessment/Plan


Assessment/Plan:


ASSESSMENT


sepsis


acute hypoxemic resp failure ( requiring high flow of O2 and manifested with 

tachypnea)


PNA


suspected CoVID 19 nfection


elevated troponin, possible NSTEMI


possible CHF


HTN urgency, on admission


LIZA vs CKD 


transaminitis   





PLAN OF CARE 


tele 


isolation


02 titrate to keep sat abvoe 92%


MDI Proventil prn  until known  CoVID result


empiric abx


SCX if able


fup with CXR 


DVT prophylaxis 


fup with SARS-CoV2 by PCR 


noted elevated CRP, D dimer, ferritin, LDH, trend further


repeat troponin


ECHO 


Venous Duplex BLE


resume home emds , including ASA and sttin


continue Hydralazine,  Digoxin and Isordil for CHF, monitor volumes


hydration, monitor e/lytes, renal parameters, avoid nephrotoxics


BP management with current regimen and optimize further as needed


fup with LFT 





case discussed and evaluated by supervising physician











Arelis Bergman NP May 17, 2020 07:35

## 2020-05-17 NOTE — NUR
ED Nurse Note:



Pt is stable for transport to tele unit at this time per ERMD. Pt is awake and 
alert, NAD. Pt remains on 4L oxygen via NC. Endorsed plan to receiving RN that 
azithromycin is infusing via IV. IV is patent and intact. Pt has no belongings. 
Pt taken to unit via gurney, connected to cardiac monitor by tech and RN. Pt 
vitals are stable.

## 2020-05-17 NOTE — NUR
NURSE NOTES:

Received report from LISA Mcgowan. Pt awake, A/O x2, no s/sx of acute distress. Pt on 4L NC, 
breathing even, with barking cough observed. Pt denies any pain. IV site on L FA patent and 
asymptomatic, running IVF as ordered. Bed on lowest position, call light within reach. Will 
continue plan of care.

## 2020-05-17 NOTE — CARDIAC ELECTROPHYSIOLOGY PN
Subjective


Subjective


[id number]





Objective





Last 24 Hour Vital Signs








  Date Time  Temp Pulse Resp B/P (MAP) Pulse Ox O2 Delivery O2 Flow Rate FiO2


 


5/17/20 12:21    149/77    


 


5/17/20 12:00 98.8 69 20 149/77 (101) 95   


 


5/17/20 11:24  71      


 


5/17/20 09:19  66      


 


5/17/20 09:19  66  155/80    


 


5/17/20 09:00      Nasal Cannula 4.0 


 


5/17/20 08:00 98.9 66 22 155/80 (105) 94   


 


5/17/20 07:55  74      


 


5/17/20 06:23    158/85    


 


5/17/20 06:23    158/88    


 


5/17/20 04:00 99.1 74 21 151/82 (105) 95   


 


5/17/20 00:45 99.5 71 22 157/86 (109) 94   


 


5/17/20 00:30 102.1 78 27 146/74 99 Nasal Cannula 4.0 


 


5/17/20 00:15      Nasal Cannula 4.0 


 


5/17/20 00:00 102.1 75 25 151/68 100 Nasal Cannula 4.0 


 


5/16/20 22:22 102.1       


 


5/16/20 21:45 104.1 80 32 160/88 97 Nasal Cannula 4.0 


 


5/16/20 21:00  90 40   Nasal Cannula 4.0 


 


5/16/20 21:00 102.0 90 40 178/100 97 Nasal Cannula 4.0 


 


5/16/20 20:59  90 40 178/100 (126) 97 Nasal Cannula 4.0 

















Intake and Output  


 


 5/16/20 5/17/20





 19:00 07:00


 


Intake Total  0 ml


 


Output Total  100 ml


 


Balance  -100 ml


 


  


 


Intake Oral  0 ml


 


Output Urine Total  100 ml











Laboratory Tests








Test


  5/16/20


21:25 5/16/20


21:40 5/17/20


10:35 5/17/20


11:30


 


White Blood Count


  6.3 K/UL


(4.8-10.8) 


  5.7 K/UL


(4.8-10.8) 


 


 


Red Blood Count


  4.26 M/UL


(4.70-6.10)  L 


  4.62 M/UL


(4.70-6.10)  L 


 


 


Hemoglobin


  13.6 G/DL


(14.2-18.0)  L 


  14.8 G/DL


(14.2-18.0) 


 


 


Hematocrit


  42.1 %


(42.0-52.0) 


  42.6 %


(42.0-52.0) 


 


 


Mean Corpuscular Volume 99 FL (80-99)    92 FL (80-99)   


 


Mean Corpuscular Hemoglobin


  31.9 PG


(27.0-31.0)  H 


  32.0 PG


(27.0-31.0)  H 


 


 


Mean Corpuscular Hemoglobin


Concent 32.3 G/DL


(32.0-36.0) 


  34.7 G/DL


(32.0-36.0) 


 


 


Red Cell Distribution Width


  12.6 %


(11.6-14.8) 


  11.3 %


(11.6-14.8)  L 


 


 


Platelet Count


  124 K/UL


(150-450)  L 


  121 K/UL


(150-450)  L 


 


 


Mean Platelet Volume


  7.8 FL


(6.5-10.1) 


  6.3 FL


(6.5-10.1)  L 


 


 


Neutrophils (%) (Auto)


  81.5 %


(45.0-75.0)  H 


  83.8 %


(45.0-75.0)  H 


 


 


Lymphocytes (%) (Auto)


  10.3 %


(20.0-45.0)  L 


  9.2 %


(20.0-45.0)  L 


 


 


Monocytes (%) (Auto)


  7.7 %


(1.0-10.0) 


  6.6 %


(1.0-10.0) 


 


 


Eosinophils (%) (Auto)


  0.0 %


(0.0-3.0) 


  0.0 %


(0.0-3.0) 


 


 


Basophils (%) (Auto)


  0.6 %


(0.0-2.0) 


  0.4 %


(0.0-2.0) 


 


 


Prothrombin Time


  12.7 SEC


(9.30-11.50)  H 


  


  


 


 


Prothromb Time International


Ratio 1.2 (0.9-1.1)


H 


  


  


 


 


Activated Partial


Thromboplast Time 36 SEC (23-33)


H 


  


  


 


 


D-Dimer


  2.57 mg/L FEU


(0.00-0.49)  H 


  


  


 


 


Sodium Level


  146 MMOL/L


(136-145)  H 


  147 MMOL/L


(136-145)  H 


 


 


Potassium Level


  3.9 MMOL/L


(3.5-5.1) 


  3.7 MMOL/L


(3.5-5.1) 


 


 


Chloride Level


  108 MMOL/L


()  H 


  110 MMOL/L


()  H 


 


 


Carbon Dioxide Level


  25 MMOL/L


(21-32) 


  25 MMOL/L


(21-32) 


 


 


Anion Gap


  13 mmol/L


(5-15) 


  12 mmol/L


(5-15) 


 


 


Blood Urea Nitrogen


  48 mg/dL


(7-18)  H 


  46 mg/dL


(7-18)  H 


 


 


Creatinine


  2.0 MG/DL


(0.55-1.30)  H 


  1.9 MG/DL


(0.55-1.30)  H 


 


 


Estimat Glomerular Filtration


Rate 39.1 mL/min


(>60) 


  41.6 mL/min


(>60) 


 


 


Glucose Level


  100 MG/DL


() 


  147 MG/DL


()  H 


 


 


Lactic Acid Level


  0.90 mmol/L


(0.4-2.0) 


  


  


 


 


Calcium Level


  8.6 MG/DL


(8.5-10.1) 


  8.6 MG/DL


(8.5-10.1) 


 


 


Magnesium Level


  2.2 MG/DL


(1.8-2.4) 


  2.2 MG/DL


(1.8-2.4) 


 


 


Ferritin


  1398 NG/ML


(8-388)  H 


  


  


 


 


Total Bilirubin


  1.9 MG/DL


(0.2-1.0)  H 


  1.8 MG/DL


(0.2-1.0)  H 


 


 


Direct Bilirubin


  1.1 MG/DL


(0.0-0.3)  H 


  1.1 MG/DL


(0.0-0.3)  H 


 


 


Aspartate Amino Transf


(AST/SGOT) 60 U/L (15-37)


H 


  80 U/L (15-37)


H 


 


 


Alanine Aminotransferase


(ALT/SGPT) 50 U/L (12-78)


  


  59 U/L (12-78)


  


 


 


Alkaline Phosphatase


  106 U/L


() 


  108 U/L


() 


 


 


Lactate Dehydrogenase


  458 U/L


()  H 


  


  


 


 


Total Creatine Kinase


  139 U/L


() 


  


  


 


 


Troponin I


  0.151 ng/mL


(0.000-0.056) 


  0.180 ng/mL


(0.000-0.056) 


 


 


C-Reactive Protein,


Quantitative 27.8 mg/dL


(0.00-0.90)  H 


  


  


 


 


Pro-B-Type Natriuretic Peptide


  4086 pg/mL


(0-125)  H 


  


  


 


 


Total Protein


  7.5 G/DL


(6.4-8.2) 


  7.2 G/DL


(6.4-8.2) 


 


 


Albumin


  2.8 G/DL


(3.4-5.0)  L 


  2.6 G/DL


(3.4-5.0)  L 


 


 


Globulin 4.7 g/dL    4.6 g/dL   


 


Albumin/Globulin Ratio


  0.6 (1.0-2.7)


L 


  0.6 (1.0-2.7)


L 


 


 


Lipase


  256 U/L


() 


  


  


 


 


Urine Color  Yellow    


 


Urine Appearance  Cloudy    


 


Urine pH  5 (4.5-8.0)    


 


Urine Specific Gravity


  


  1.015


(1.005-1.035) 


  


 


 


Urine Protein


  


  3+ (NEGATIVE)


H 


  


 


 


Urine Glucose (UA)


  


  Negative


(NEGATIVE) 


  


 


 


Urine Ketones


  


  Negative


(NEGATIVE) 


  


 


 


Urine Blood


  


  3+ (NEGATIVE)


H 


  


 


 


Urine Nitrite


  


  Negative


(NEGATIVE) 


  


 


 


Urine Bilirubin


  


  Negative


(NEGATIVE) 


  


 


 


Urine Urobilinogen


  


  1 MG/DL


(0.0-1.0)  H 


  


 


 


Urine Leukocyte Esterase


  


  2+ (NEGATIVE)


H 


  


 


 


Urine RBC


  


  40-60 /HPF (0


- 0)  H 


  


 


 


Urine WBC


  


  30-40 /HPF (0


- 0)  H 


  


 


 


Urine Squamous Epithelial


Cells 


  Few /LPF


(NONE/OCC) 


  


 


 


Urine Amorphous Sediment


  


  Many /LPF


(NONE)  H 


  


 


 


Urine Bacteria


  


  Many /HPF


(NONE)  H 


  


 


 


Phosphorus Level


  


  


  3.3 MG/DL


(2.5-4.9) 


 


 


Digoxin Level


  


  


  1.7 NG/ML


(0.5-2.0) 


 


 


Urine Random Sodium


  


  


  


  < 20 mmol/L


()  L











Microbiology








 Date/Time


Source Procedure


Growth Status


 


 


 5/17/20 06:50


Rectum  Received

















Jake Lan MD May 17, 2020 14:08

## 2020-05-18 VITALS — SYSTOLIC BLOOD PRESSURE: 161 MMHG | DIASTOLIC BLOOD PRESSURE: 89 MMHG

## 2020-05-18 VITALS — SYSTOLIC BLOOD PRESSURE: 158 MMHG | DIASTOLIC BLOOD PRESSURE: 75 MMHG

## 2020-05-18 VITALS — DIASTOLIC BLOOD PRESSURE: 73 MMHG | SYSTOLIC BLOOD PRESSURE: 148 MMHG

## 2020-05-18 VITALS — SYSTOLIC BLOOD PRESSURE: 159 MMHG | DIASTOLIC BLOOD PRESSURE: 76 MMHG

## 2020-05-18 VITALS — DIASTOLIC BLOOD PRESSURE: 81 MMHG | SYSTOLIC BLOOD PRESSURE: 165 MMHG

## 2020-05-18 LAB
% IRON SATURATION: 33 % (ref 15–50)
ADD MANUAL DIFF: NO
ALBUMIN SERPL-MCNC: 2.4 G/DL (ref 3.4–5)
ALBUMIN/GLOB SERPL: 0.5 {RATIO} (ref 1–2.7)
ALP SERPL-CCNC: 111 U/L (ref 46–116)
ALT SERPL-CCNC: 59 U/L (ref 12–78)
ANION GAP SERPL CALC-SCNC: 10 MMOL/L (ref 5–15)
AST SERPL-CCNC: 72 U/L (ref 15–37)
BASOPHILS NFR BLD AUTO: 0.7 % (ref 0–2)
BILIRUB DIRECT SERPL-MCNC: 0.9 MG/DL (ref 0–0.3)
BILIRUB SERPL-MCNC: 1.5 MG/DL (ref 0.2–1)
BUN SERPL-MCNC: 42 MG/DL (ref 7–18)
CALCIUM SERPL-MCNC: 8.4 MG/DL (ref 8.5–10.1)
CHLORIDE SERPL-SCNC: 108 MMOL/L (ref 98–107)
CHOLEST SERPL-MCNC: 87 MG/DL (ref ?–200)
CO2 SERPL-SCNC: 24 MMOL/L (ref 21–32)
CREAT SERPL-MCNC: 1.7 MG/DL (ref 0.55–1.3)
EOSINOPHIL NFR BLD AUTO: 0.1 % (ref 0–3)
ERYTHROCYTE [DISTWIDTH] IN BLOOD BY AUTOMATED COUNT: 11.3 % (ref 11.6–14.8)
FERRITIN SERPL-MCNC: 1821 NG/ML (ref 8–388)
GAMMA GLUTAMYL TRANSPEPTIDASE: 77 U/L (ref 5–85)
GLOBULIN SER-MCNC: 4.6 G/DL
HCT VFR BLD CALC: 42.3 % (ref 42–52)
HDLC SERPL-MCNC: 31 MG/DL (ref 40–60)
HGB BLD-MCNC: 14.6 G/DL (ref 14.2–18)
IRON SERPL-MCNC: 45 UG/DL (ref 50–175)
LDH SERPL L TO P-CCNC: 291 U/L (ref 81–234)
LYMPHOCYTES NFR BLD AUTO: 11.4 % (ref 20–45)
MCV RBC AUTO: 91 FL (ref 80–99)
MONOCYTES NFR BLD AUTO: 7.1 % (ref 1–10)
NEUTROPHILS NFR BLD AUTO: 80.7 % (ref 45–75)
PHOSPHATE SERPL-MCNC: 3 MG/DL (ref 2.5–4.9)
PLATELET # BLD: 121 K/UL (ref 150–450)
POTASSIUM SERPL-SCNC: 3.7 MMOL/L (ref 3.5–5.1)
RBC # BLD AUTO: 4.62 M/UL (ref 4.7–6.1)
SODIUM SERPL-SCNC: 142 MMOL/L (ref 136–145)
TIBC SERPL-MCNC: 138 UG/DL (ref 250–450)
TRIGL SERPL-MCNC: 67 MG/DL (ref 30–150)
UNSATURATED IRON BINDING: 93 UG/DL (ref 112–346)
WBC # BLD AUTO: 6 K/UL (ref 4.8–10.8)

## 2020-05-18 RX ADMIN — SORBITOL SOLUTION (BULK) SCH ML: 70 SOLUTION at 12:57

## 2020-05-18 RX ADMIN — DOCUSATE SODIUM SCH MG: 250 CAPSULE, LIQUID FILLED ORAL at 10:03

## 2020-05-18 RX ADMIN — DILTIAZEM HYDROCHLORIDE SCH MG: 180 CAPSULE, EXTENDED RELEASE ORAL at 10:03

## 2020-05-18 RX ADMIN — SORBITOL SOLUTION (BULK) SCH ML: 70 SOLUTION at 07:21

## 2020-05-18 RX ADMIN — HYDRALAZINE HYDROCHLORIDE SCH MG: 25 TABLET ORAL at 21:44

## 2020-05-18 RX ADMIN — SORBITOL SOLUTION (BULK) SCH ML: 70 SOLUTION at 17:13

## 2020-05-18 RX ADMIN — DEXTROSE, SODIUM CHLORIDE, AND POTASSIUM CHLORIDE SCH MLS/HR: 5; .45; .15 INJECTION INTRAVENOUS at 17:13

## 2020-05-18 RX ADMIN — Medication SCH EA: at 10:00

## 2020-05-18 RX ADMIN — DOCUSATE SODIUM SCH MG: 250 CAPSULE, LIQUID FILLED ORAL at 17:12

## 2020-05-18 RX ADMIN — HYDRALAZINE HYDROCHLORIDE SCH MG: 25 TABLET ORAL at 14:31

## 2020-05-18 RX ADMIN — HYDRALAZINE HYDROCHLORIDE SCH MG: 25 TABLET ORAL at 07:21

## 2020-05-18 RX ADMIN — DEXTROSE, SODIUM CHLORIDE, AND POTASSIUM CHLORIDE SCH MLS/HR: 5; .45; .15 INJECTION INTRAVENOUS at 03:40

## 2020-05-18 RX ADMIN — ASPIRIN SCH MG: 81 TABLET, DELAYED RELEASE ORAL at 10:03

## 2020-05-18 RX ADMIN — ATORVASTATIN CALCIUM SCH MG: 20 TABLET, FILM COATED ORAL at 21:43

## 2020-05-18 RX ADMIN — SORBITOL SOLUTION (BULK) SCH ML: 70 SOLUTION at 01:01

## 2020-05-18 RX ADMIN — AZITHROMYCIN DIHYDRATE SCH MLS/HR: 500 INJECTION, POWDER, LYOPHILIZED, FOR SOLUTION INTRAVENOUS at 21:43

## 2020-05-18 RX ADMIN — APIXABAN SCH MG: 2.5 TABLET, FILM COATED ORAL at 17:12

## 2020-05-18 RX ADMIN — DIGOXIN SCH MG: 0.12 TABLET ORAL at 10:02

## 2020-05-18 NOTE — NUR
****TRANSFER/DISCHARGE

PLAN:

COVID 19 RESULTS PENDING

CALLED [facility] @ [phone] AND SPOKE WITH ADELAIDA

THEY WILL WAIT FOR COVID RESULTS AND ORDER STATING "STABLE FOR TRANSFER" BEFORE INITIATING 
SENDING A TEAM FOR THEIR PATIENT

PATIENT HAS BEEN AUTHORIZED TO REMAIN AT [facility] FOR TODAY AND CASE WILL BE REVISITED 
TOMORROW @ 10AM

AUTH/REF #[id number]

## 2020-05-18 NOTE — INTERNAL MED PROGRESS NOTE
Subjective


Date of Service:  May 18, 2020


Physician Name


Derek Ortez


Attending Physician


Urban Brown MD





Current Medications








 Medications


  (Trade)  Dose


 Ordered  Sig/Michael


 Route


 PRN Reason  Start Time


 Stop Time Status Last Admin


Dose Admin


 


 Acetaminophen


  (Tylenol)  650 mg  Q6H  PRN


 ORAL


 mild pain/fever  5/17/20 08:48


 6/16/20 08:47   


 


 


 Albuterol Sulfate


  (Proventil MDI)  2 puff  Q4H  PRN


 INH


 Shortness of Breath  5/17/20 07:30


 8/15/20 07:29   


 


 


 Apixaban


  (Eliquis)  2.5 mg  BID


 ORAL


   5/18/20 18:00


 8/16/20 17:59  5/18/20 17:12


 


 


 Artificial Tears


  (Akwa-Tears)  1 drop  BID


 BOTH EYES


   5/17/20 09:00


 6/16/20 08:59  5/18/20 17:13


 


 


 Aspirin


  (Ecotrin)  81 mg  DAILY


 ORAL


   5/17/20 09:00


 7/1/20 08:59  5/18/20 10:03


 


 


 Atorvastatin


 Calcium


  (Lipitor)  20 mg  BEDTIME


 ORAL


   5/17/20 21:00


 8/15/20 20:59  5/17/20 23:29


 


 


 Azithromycin 500


 mg/Dextrose  275 ml @ 


 275 mls/hr  Q24H


 IV


   5/17/20 21:00


 5/23/20 21:59  5/17/20 21:00


 


 


 Bisacodyl


  (Dulcolax)  10 mg  PRN


 RECTAL


   5/17/20 00:15


 8/15/20 00:14   


 


 


 Cefepime HCl 1 gm/


 Dextrose  50 ml @ 


 100 mls/hr  EVERY 12  HOURS


 IVPB


   5/17/20 09:00


 5/24/20 08:59  5/18/20 10:01


 


 


 Clonidine HCl


  (Catapres Tab)  0.1 mg  Q4H  PRN


 ORAL


 sbp>160  5/17/20 08:48


 8/15/20 08:47   


 


 


 Dextrose


  (Dextrose 50%)  25 ml  Q30M  PRN


 IV


 Hypoglycemia  5/17/20 00:15


 8/15/20 00:14   


 


 


 Dextrose


  (Dextrose 50%)  50 ml  Q30M  PRN


 IV


 Hypoglycemia  5/17/20 00:15


 8/15/20 00:14   


 


 


 Dextrose/


 Electrolytes  1,000 ml @ 


 75 mls/hr  [id number]


 IV


   5/17/20 01:00


 6/16/20 00:59  5/18/20 17:13


 


 


 Digoxin


  (Lanoxin)  0.125 mg  DAILY


 ORAL


   5/17/20 09:00


 8/15/20 08:59  5/18/20 10:02


 


 


 Diltiazem HCl


  (Cardizem CD)  180 mg  DAILY


 ORAL


   5/17/20 09:00


 6/16/20 08:59  5/18/20 10:03


 


 


 Docusate Sodium


  (Colace)  250 mg  TWICE A  DAY


 ORAL


   5/17/20 09:00


 6/16/20 08:59  5/18/20 17:12


 


 


 Hydralazine HCl


  (Apresoline)  25 mg  EVERY 8  HOURS


 ORAL


   5/17/20 06:00


 8/15/20 05:59  5/18/20 14:31


 


 


 Isosorbide


 Dinitrate


  (Isordil)  10 mg  Q6HR


 ORAL


   5/17/20 06:00


 6/16/20 05:59  5/18/20 17:12


 


 


 Metoprolol


 Tartrate


  (Lopressor)  25 mg  EVERY 12  HOURS


 ORAL


   5/17/20 21:00


 8/15/20 20:59  5/18/20 10:02


 


 


 Mineral Oil


  (Fleet's Mineral


 Oil Enema)  133 ml  DAILYPRN  PRN


 RECTAL


 CONSTIPATION  5/17/20 05:58


 6/16/20 05:57   


 


 


 Multivitamins


 Therapeutic


  (Therapeutic


 Multivitamin)  1 ea  DAILY


 ORAL


   5/17/20 09:00


 6/16/20 08:59  5/18/20 10:00


 


 


 Sorbitol


  (sorbitoL)  30 ml  EVERY 6  HOURS


 ORAL


   5/17/20 12:00


 6/16/20 11:59  5/18/20 17:13


 








Allergies:  


Coded Allergies:  


     No Known Allergies (Unverified , 5/16/20)


ROS Limited/Unobtainable:  Yes


Subjective


79 YO M admitted with shortness of breath.  Now respiratory failure.  Cover for 

Int Kiko-DR Brown





Objective





Last Vital Signs








  Date Time  Temp Pulse Resp B/P (MAP) Pulse Ox O2 Delivery O2 Flow Rate FiO2


 


5/18/20 17:12    148/73    


 


5/18/20 16:00 98.2 54 18  95   


 


5/18/20 09:00      Nasal Cannula 4.0 











Laboratory Tests








Test


  5/18/20


06:25


 


White Blood Count


  6.0 K/UL


(4.8-10.8)


 


Red Blood Count


  4.62 M/UL


(4.70-6.10)  L


 


Hemoglobin


  14.6 G/DL


(14.2-18.0)


 


Hematocrit


  42.3 %


(42.0-52.0)


 


Mean Corpuscular Volume 91 FL (80-99)  


 


Mean Corpuscular Hemoglobin


  31.6 PG


(27.0-31.0)  H


 


Mean Corpuscular Hemoglobin


Concent 34.6 G/DL


(32.0-36.0)


 


Red Cell Distribution Width


  11.3 %


(11.6-14.8)  L


 


Platelet Count


  121 K/UL


(150-450)  L


 


Mean Platelet Volume


  6.5 FL


(6.5-10.1)


 


Neutrophils (%) (Auto)


  80.7 %


(45.0-75.0)  H


 


Lymphocytes (%) (Auto)


  11.4 %


(20.0-45.0)  L


 


Monocytes (%) (Auto)


  7.1 %


(1.0-10.0)


 


Eosinophils (%) (Auto)


  0.1 %


(0.0-3.0)


 


Basophils (%) (Auto)


  0.7 %


(0.0-2.0)


 


Sodium Level


  142 MMOL/L


(136-145)


 


Potassium Level


  3.7 MMOL/L


(3.5-5.1)


 


Chloride Level


  108 MMOL/L


()  H


 


Carbon Dioxide Level


  24 MMOL/L


(21-32)


 


Anion Gap


  10 mmol/L


(5-15)


 


Blood Urea Nitrogen


  42 mg/dL


(7-18)  H


 


Creatinine


  1.7 MG/DL


(0.55-1.30)  H


 


Estimat Glomerular Filtration


Rate 47.3 mL/min


(>60)


 


Glucose Level


  100 MG/DL


()


 


Hemoglobin A1c


  4.6 %


(4.3-6.0)


 


Lactic Acid Level


  1.20 mmol/L


(0.4-2.0)


 


Uric Acid


  5.9 MG/DL


(2.6-7.2)


 


Calcium Level


  8.4 MG/DL


(8.5-10.1)  L


 


Phosphorus Level


  3.0 MG/DL


(2.5-4.9)


 


Magnesium Level


  2.2 MG/DL


(1.8-2.4)


 


Iron Level


  45 ug/dL


()  L


 


Total Iron Binding Capacity


  138 ug/dL


(250-450)  L


 


Percent Iron Saturation 33 % (15-50)  


 


Unsaturated Iron Binding


  93 ug/dL


(112-346)  L


 


Ferritin


  1821 NG/ML


(8-388)  H


 


Total Bilirubin


  1.5 MG/DL


(0.2-1.0)  H


 


Direct Bilirubin


  0.9 MG/DL


(0.0-0.3)  H


 


Gamma Glutamyl Transpeptidase 77 U/L (5-85)  


 


Aspartate Amino Transf


(AST/SGOT) 72 U/L (15-37)


H


 


Alanine Aminotransferase


(ALT/SGPT) 59 U/L (12-78)


 


 


Alkaline Phosphatase


  111 U/L


()


 


Lactate Dehydrogenase


  291 U/L


()  H


 


Troponin I


  0.101 ng/mL


(0.000-0.056)


 


C-Reactive Protein,


Quantitative 33.6 mg/dL


(0.00-0.90)  H


 


Pro-B-Type Natriuretic Peptide


  5503 pg/mL


(0-125)  H


 


Total Protein


  7.0 G/DL


(6.4-8.2)


 


Albumin


  2.4 G/DL


(3.4-5.0)  L


 


Globulin 4.6 g/dL  


 


Albumin/Globulin Ratio


  0.5 (1.0-2.7)


L


 


Triglycerides Level


  67 MG/DL


()


 


Cholesterol Level


  87 MG/DL (<


200)


 


LDL Cholesterol


  40 mg/dL


(<100)


 


HDL Cholesterol


  31 MG/DL


(40-60)  L


 


Cholesterol/HDL Ratio


  2.8 (3.3-4.4)


L


 


Vitamin B12 Level


  1361 PG/ML


(193-986)  H


 


Folate


  10.7 NG/ML


(8.6-58.9)


 


Thyroid Stimulating Hormone


(TSH) 2.209 uiU/mL


(0.358-3.740)


 


Digoxin Level


  0.9 NG/ML


(0.5-2.0)











Microbiology








 Date/Time


Source Procedure


Growth Status


 


 


 5/16/20 21:40


Urine,Clean Catch Urine Culture - Preliminary


Gram Negative Bacillus 1 Resulted


 


 5/17/20 06:50


Rectum  Received

















Intake and Output  


 


 5/17/20 5/18/20





 19:00 07:00


 


Intake Total  1150 ml


 


Output Total 200 ml 


 


Balance -200 ml 1150 ml


 


  


 


IV Total  1150 ml


 


Output Urine Total 200 ml 


 


# Voids 1 








Objective


PHYSICAL EXAMINATION:


GENERAL:  The patient awake, responsive, no acute distress.


HEENT:  Pupils are equal and reactive to light.  Extraocular movements


intact.  Neck was supple.  No JVD.


LUNGS:  Good air entry.  No wheezing or rales. Decreased in bases.


HEART:  S1 and S2.  Distant heart sounds.  No murmur or gallops.


ABDOMEN:  Soft, nondistended, nontender. Positive bowel sounds.


EXTREMITIES:  No cyanosis, clubbing, edema.


NEUROLOGIC:  Cranial nerves II through XII grossly normal.  The patient


moving left side upper extremities and lower extremity.  Right upper


extremity contracture.  Right lower extremity is decreased motor.





Assessment/Plan


Assessment/Plan


ASSESSMENT:


1. Right lower lobe pneumonia.


2. Acute hypoxemic respiratory failure.


3. COVID-19 ruled out infection.


4. Acute kidney injury on chronic renal insufficiency.


5. Sepsis.


6. Hypertension.


7. Dyslipidemia.


8. History of CVA with right hemiparesis.


9. Admit to telemetry on the COVID-19 isolation.  We will follow up with


COVID-19  cultures and follow up with urine culture and blood culture.


Monitor laboratory, IV hydration, and start the patient on broad-spectrum


antibiotic with azithromycin and cefepime.


10. DVT prophylaxis on Lovenox.


11. Code status is DNR.


12. We will follow up with Dr. Lan from Cardiology Electrophysiology,


Dr. Barcenas, Pulmonary Critical Care, Dr. Mello from Nephrology, and


Dr. Gentile from Infectious Diseases consultations.


13.  Non ST elevated myocardial infarction/elevated troponin











Derek Ortez MD May 18, 2020 18:32

## 2020-05-18 NOTE — CARDIAC ELECTROPHYSIOLOGY PN
Assessment/Plan


Assessment/Plan


1. Non-ST elevation myocardial infarction by elevated troponin.


However, this could be due to renal failure, creatinine is 2.  The EKG


showed atrial fibrillation, occasional PVC. The patient on aspirin and


Lipitor,metoprolol and Imdur.  Echocardiogram EF 65%





2. Atrial fibrillation.  The rate is currently controlled on Cardizem CD


180 mg daily and Digoxin.  DC subcutaneous Lovenox and start Eliquis 5 bid.


DIg level in am





3. Hypertension, stable on our Cardizem, Hydralazine, Isordil .


4. COPD on albuterol.


5. Occasional PVCs.  Echocardiogram shows ejection fraction of 65%.  


6. Rule out COVID pneumonia.


7. Renal failure, creatinine of 2.








DW Dr Mello





Subjective


Subjective


Remained in atrial fib with controlled rate. First Covid is pending





Objective





Last 24 Hour Vital Signs








  Date Time  Temp Pulse Resp B/P (MAP) Pulse Ox O2 Delivery O2 Flow Rate FiO2


 


5/18/20 07:21    148/86    


 


5/18/20 07:21    148/86    


 


5/18/20 04:00 98.2 56 20 161/89 (113) 98   


 


5/18/20 01:00    161/84    


 


5/17/20 23:29  69  149/72    


 


5/17/20 23:28    149/72    


 


5/17/20 21:00      Nasal Cannula 4.0 


 


5/17/20 20:00 96.1 64 19 149/72 (97) 94   


 


5/17/20 17:36    151/78    


 


5/17/20 16:00 98.6 61 22 151/78 (102) 96   


 


5/17/20 15:49  67      


 


5/17/20 14:25    149/77    


 


5/17/20 12:21    149/77    


 


5/17/20 12:00 98.8 69 20 149/77 (101) 95   


 


5/17/20 11:24  71      

















Intake and Output  


 


 5/17/20 5/18/20





 19:00 07:00


 


Intake Total  1075 ml


 


Output Total 200 ml 


 


Balance -200 ml 1075 ml


 


  


 


IV Total  1075 ml


 


Output Urine Total 200 ml 


 


# Voids 1 











Laboratory Tests








Test


  5/17/20


10:35 5/17/20


11:30 5/18/20


06:25


 


White Blood Count


  5.7 K/UL


(4.8-10.8) 


  6.0 K/UL


(4.8-10.8)


 


Red Blood Count


  4.62 M/UL


(4.70-6.10)  L 


  4.62 M/UL


(4.70-6.10)  L


 


Hemoglobin


  14.8 G/DL


(14.2-18.0) 


  14.6 G/DL


(14.2-18.0)


 


Hematocrit


  42.6 %


(42.0-52.0) 


  42.3 %


(42.0-52.0)


 


Mean Corpuscular Volume 92 FL (80-99)    91 FL (80-99)  


 


Mean Corpuscular Hemoglobin


  32.0 PG


(27.0-31.0)  H 


  31.6 PG


(27.0-31.0)  H


 


Mean Corpuscular Hemoglobin


Concent 34.7 G/DL


(32.0-36.0) 


  34.6 G/DL


(32.0-36.0)


 


Red Cell Distribution Width


  11.3 %


(11.6-14.8)  L 


  11.3 %


(11.6-14.8)  L


 


Platelet Count


  121 K/UL


(150-450)  L 


  121 K/UL


(150-450)  L


 


Mean Platelet Volume


  6.3 FL


(6.5-10.1)  L 


  6.5 FL


(6.5-10.1)


 


Neutrophils (%) (Auto)


  83.8 %


(45.0-75.0)  H 


  80.7 %


(45.0-75.0)  H


 


Lymphocytes (%) (Auto)


  9.2 %


(20.0-45.0)  L 


  11.4 %


(20.0-45.0)  L


 


Monocytes (%) (Auto)


  6.6 %


(1.0-10.0) 


  7.1 %


(1.0-10.0)


 


Eosinophils (%) (Auto)


  0.0 %


(0.0-3.0) 


  0.1 %


(0.0-3.0)


 


Basophils (%) (Auto)


  0.4 %


(0.0-2.0) 


  0.7 %


(0.0-2.0)


 


Sodium Level


  147 MMOL/L


(136-145)  H 


  142 MMOL/L


(136-145)


 


Potassium Level


  3.7 MMOL/L


(3.5-5.1) 


  3.7 MMOL/L


(3.5-5.1)


 


Chloride Level


  110 MMOL/L


()  H 


  108 MMOL/L


()  H


 


Carbon Dioxide Level


  25 MMOL/L


(21-32) 


  24 MMOL/L


(21-32)


 


Anion Gap


  12 mmol/L


(5-15) 


  10 mmol/L


(5-15)


 


Blood Urea Nitrogen


  46 mg/dL


(7-18)  H 


  42 mg/dL


(7-18)  H


 


Creatinine


  1.9 MG/DL


(0.55-1.30)  H 


  1.7 MG/DL


(0.55-1.30)  H


 


Estimat Glomerular Filtration


Rate 41.6 mL/min


(>60) 


  47.3 mL/min


(>60)


 


Glucose Level


  147 MG/DL


()  H 


  100 MG/DL


()


 


Calcium Level


  8.6 MG/DL


(8.5-10.1) 


  8.4 MG/DL


(8.5-10.1)  L


 


Phosphorus Level


  3.3 MG/DL


(2.5-4.9) 


  3.0 MG/DL


(2.5-4.9)


 


Magnesium Level


  2.2 MG/DL


(1.8-2.4) 


  2.2 MG/DL


(1.8-2.4)


 


Total Bilirubin


  1.8 MG/DL


(0.2-1.0)  H 


  1.5 MG/DL


(0.2-1.0)  H


 


Direct Bilirubin


  1.1 MG/DL


(0.0-0.3)  H 


  0.9 MG/DL


(0.0-0.3)  H


 


Aspartate Amino Transf


(AST/SGOT) 80 U/L (15-37)


H 


  72 U/L (15-37)


H


 


Alanine Aminotransferase


(ALT/SGPT) 59 U/L (12-78)


  


  59 U/L (12-78)


 


 


Alkaline Phosphatase


  108 U/L


() 


  111 U/L


()


 


Troponin I


  0.180 ng/mL


(0.000-0.056) 


  0.101 ng/mL


(0.000-0.056)


 


Total Protein


  7.2 G/DL


(6.4-8.2) 


  7.0 G/DL


(6.4-8.2)


 


Albumin


  2.6 G/DL


(3.4-5.0)  L 


  2.4 G/DL


(3.4-5.0)  L


 


Globulin 4.6 g/dL    4.6 g/dL  


 


Albumin/Globulin Ratio


  0.6 (1.0-2.7)


L 


  0.5 (1.0-2.7)


L


 


Digoxin Level


  1.7 NG/ML


(0.5-2.0) 


  0.9 NG/ML


(0.5-2.0)


 


Urine Random Sodium


  


  < 20 mmol/L


()  L 


 


 


Hemoglobin A1c


  


  


  4.6 %


(4.3-6.0)


 


Lactic Acid Level


  


  


  1.20 mmol/L


(0.4-2.0)


 


Uric Acid


  


  


  5.9 MG/DL


(2.6-7.2)


 


Iron Level


  


  


  45 ug/dL


()  L


 


Total Iron Binding Capacity


  


  


  138 ug/dL


(250-450)  L


 


Percent Iron Saturation   33 % (15-50)  


 


Unsaturated Iron Binding


  


  


  93 ug/dL


(112-346)  L


 


Ferritin


  


  


  1821 NG/ML


(8-388)  H


 


Gamma Glutamyl Transpeptidase   77 U/L (5-85)  


 


Lactate Dehydrogenase


  


  


  291 U/L


()  H


 


C-Reactive Protein,


Quantitative 


  


  33.6 mg/dL


(0.00-0.90)  H


 


Pro-B-Type Natriuretic Peptide


  


  


  5503 pg/mL


(0-125)  H


 


Triglycerides Level


  


  


  67 MG/DL


()


 


Cholesterol Level


  


  


  87 MG/DL (<


200)


 


LDL Cholesterol


  


  


  40 mg/dL


(<100)


 


HDL Cholesterol


  


  


  31 MG/DL


(40-60)  L


 


Cholesterol/HDL Ratio


  


  


  2.8 (3.3-4.4)


L


 


Vitamin B12 Level


  


  


  1361 PG/ML


(193-986)  H


 


Folate


  


  


  10.7 NG/ML


(8.6-58.9)


 


Thyroid Stimulating Hormone


(TSH) 


  


  2.209 uiU/mL


(0.358-3.740)











Microbiology








 Date/Time


Source Procedure


Growth Status


 


 


 5/16/20 21:40


Urine,Clean Catch Urine Culture - Preliminary


Gram Negative Bacillus 1 Resulted


 


 5/17/20 06:50


Rectum  Received








Objective





HEAD AND NECK:  Showed no JVD.


LUNGS:  Coarse rhonchi.


CARDIOVASCULAR:  Regular S1 and S2 with no gallop.


ABDOMEN:  Soft.


EXTREMITIES:  No pitting edema.











Jake Lan MD May 18, 2020 09:29

## 2020-05-18 NOTE — NUR
CASE MANAGEMENT:REVIEW



80 YR OLD MALE BIBA FROM [facility] 



CC; ALOC. LETHARGIC



SI: PNEUMONIA. SUSPECTED COVID 19. RENAL INSUFFICIENCY

104.0  90  40   178/100  97% ON 4L/NC

BUN+48   CR+2.0   TBILI+1.9   DBILI+1.1  BNP+4086  PLT-124  TROPONIN(+) 0.151



IS: IV VANCOMYCIN 

IV ROCEPHIN 

IV AZITHROMYCIN

1L NS BOLUS

TYLENOL [place]

URINE CX

CHEST XRAY

BLOOD CX

COVID 19 SWAB

**: TELEMETRY UNIT



5/17/20

SI: SEPSIS. RLL PNEUMONIA. AC/CHR RENAL INSUFF

96.1   64  19  149/72  94% ON 4L/NC

PLT-121   NA_147   BUN+46   CR+1.9   TBILI+1.8   DBILI+1.1   TROPONIN(+) 0.180



IS: IVF@75/HR

IV AZITHROMYCIN Q24

IV CEFEPIME Q12

LIPITOR PO QHS

SORBITOL PO Q6HRS 

ASA PO QD

CARDIZEM PO QD

MVI PO QD

DIGOXIN PO QD

ISORDIL PO Q6HRS

**: TELEMETRY STATUS

DCP: [facility] [place]





5/18/20

SI: SEPSIS. RLL PNEUMONIA. AC/CHR RENAL INSUFF

98.2    56  20   148/86   98% ON 4L/NC

PLT-121   BUN+42   CR+1.7   TROPONIN(+) 0.101   BNP+5503



IS: IVF@75/HR

IV AZITHROMYCIN Q24

IV CEFEPIME Q12

LIPITOR PO QHS

SORBITOL PO Q6HRS 

ASA PO QD

CARDIZEM PO QD

MVI PO QD

DIGOXIN PO QD

ISORDIL PO Q6HRS

**: TELEMETRY STATUS

DCP: [facility]



PLAN:

COVID 19 RESULTS PENDING

CALLED [facility] @ [phone] AND SPOKE WITH ADELAIDA

THEY WILL WAIT FOR COVID RESULTS AND ORDER STATING "STABLE FOR TRANSFER" BEFORE INITIATING 
SENDING A TEAM FOR THEIR PATIENT

PATIENT HAS BEEN AUTHORIZED TO REMAIN AT [facility] FOR TODAY AND CASE WILL BE REVISITED 
TOMORROW @ 10AM

## 2020-05-18 NOTE — CARDIAC ELECTROPHYSIOLOGY PN
Assessment/Plan


Assessment/Plan


1. Non-ST elevation myocardial infarction by elevated troponin.


However, this could be due to renal failure, creatinine is 2.  The EKG


showed atrial fibrillation, occasional PVC. The patient on aspirin and


Lipitor,metoprolol and Imdur.  Echocardiogram EF 65%





2. Atrial fibrillation.  The rate is currently controlled on Cardizem CD


180 mg daily and Digoxin.  DC subcutaneous Lovenox and start Eliquis 2.5 bid.





3. Hypertension, stable on our Cardizem.


4. COPD on albuterol.


5. Occasional PVCs.  Echocardiogram shows ejection fraction of 65%.  


6. Rule out COVID pneumonia.


7. Renal failure, creatinine of 2.





Subjective


Subjective


Remained in atrial fib with controlled rate. First Covid is pending





Objective





Last 24 Hour Vital Signs








  Date Time  Temp Pulse Resp B/P (MAP) Pulse Ox O2 Delivery O2 Flow Rate FiO2


 


5/18/20 07:21    148/86    


 


5/18/20 07:21    148/86    


 


5/18/20 04:00 98.2 56 20 161/89 (113) 98   


 


5/18/20 01:00    161/84    


 


5/17/20 23:29  69  149/72    


 


5/17/20 23:28    149/72    


 


5/17/20 21:00      Nasal Cannula 4.0 


 


5/17/20 20:00 96.1 64 19 149/72 (97) 94   


 


5/17/20 17:36    151/78    


 


5/17/20 16:00 98.6 61 22 151/78 (102) 96   


 


5/17/20 15:49  67      


 


5/17/20 14:25    149/77    


 


5/17/20 12:21    149/77    


 


5/17/20 12:00 98.8 69 20 149/77 (101) 95   


 


5/17/20 11:24  71      

















Intake and Output  


 


 5/17/20 5/18/20





 19:00 07:00


 


Intake Total  1075 ml


 


Output Total 200 ml 


 


Balance -200 ml 1075 ml


 


  


 


IV Total  1075 ml


 


Output Urine Total 200 ml 


 


# Voids 1 











Laboratory Tests








Test


  5/17/20


10:35 5/17/20


11:30 5/18/20


06:25


 


White Blood Count


  5.7 K/UL


(4.8-10.8) 


  6.0 K/UL


(4.8-10.8)


 


Red Blood Count


  4.62 M/UL


(4.70-6.10)  L 


  4.62 M/UL


(4.70-6.10)  L


 


Hemoglobin


  14.8 G/DL


(14.2-18.0) 


  14.6 G/DL


(14.2-18.0)


 


Hematocrit


  42.6 %


(42.0-52.0) 


  42.3 %


(42.0-52.0)


 


Mean Corpuscular Volume 92 FL (80-99)    91 FL (80-99)  


 


Mean Corpuscular Hemoglobin


  32.0 PG


(27.0-31.0)  H 


  31.6 PG


(27.0-31.0)  H


 


Mean Corpuscular Hemoglobin


Concent 34.7 G/DL


(32.0-36.0) 


  34.6 G/DL


(32.0-36.0)


 


Red Cell Distribution Width


  11.3 %


(11.6-14.8)  L 


  11.3 %


(11.6-14.8)  L


 


Platelet Count


  121 K/UL


(150-450)  L 


  121 K/UL


(150-450)  L


 


Mean Platelet Volume


  6.3 FL


(6.5-10.1)  L 


  6.5 FL


(6.5-10.1)


 


Neutrophils (%) (Auto)


  83.8 %


(45.0-75.0)  H 


  80.7 %


(45.0-75.0)  H


 


Lymphocytes (%) (Auto)


  9.2 %


(20.0-45.0)  L 


  11.4 %


(20.0-45.0)  L


 


Monocytes (%) (Auto)


  6.6 %


(1.0-10.0) 


  7.1 %


(1.0-10.0)


 


Eosinophils (%) (Auto)


  0.0 %


(0.0-3.0) 


  0.1 %


(0.0-3.0)


 


Basophils (%) (Auto)


  0.4 %


(0.0-2.0) 


  0.7 %


(0.0-2.0)


 


Sodium Level


  147 MMOL/L


(136-145)  H 


  142 MMOL/L


(136-145)


 


Potassium Level


  3.7 MMOL/L


(3.5-5.1) 


  3.7 MMOL/L


(3.5-5.1)


 


Chloride Level


  110 MMOL/L


()  H 


  108 MMOL/L


()  H


 


Carbon Dioxide Level


  25 MMOL/L


(21-32) 


  24 MMOL/L


(21-32)


 


Anion Gap


  12 mmol/L


(5-15) 


  10 mmol/L


(5-15)


 


Blood Urea Nitrogen


  46 mg/dL


(7-18)  H 


  42 mg/dL


(7-18)  H


 


Creatinine


  1.9 MG/DL


(0.55-1.30)  H 


  1.7 MG/DL


(0.55-1.30)  H


 


Estimat Glomerular Filtration


Rate 41.6 mL/min


(>60) 


  47.3 mL/min


(>60)


 


Glucose Level


  147 MG/DL


()  H 


  100 MG/DL


()


 


Calcium Level


  8.6 MG/DL


(8.5-10.1) 


  8.4 MG/DL


(8.5-10.1)  L


 


Phosphorus Level


  3.3 MG/DL


(2.5-4.9) 


  3.0 MG/DL


(2.5-4.9)


 


Magnesium Level


  2.2 MG/DL


(1.8-2.4) 


  2.2 MG/DL


(1.8-2.4)


 


Total Bilirubin


  1.8 MG/DL


(0.2-1.0)  H 


  1.5 MG/DL


(0.2-1.0)  H


 


Direct Bilirubin


  1.1 MG/DL


(0.0-0.3)  H 


  0.9 MG/DL


(0.0-0.3)  H


 


Aspartate Amino Transf


(AST/SGOT) 80 U/L (15-37)


H 


  72 U/L (15-37)


H


 


Alanine Aminotransferase


(ALT/SGPT) 59 U/L (12-78)


  


  59 U/L (12-78)


 


 


Alkaline Phosphatase


  108 U/L


() 


  111 U/L


()


 


Troponin I


  0.180 ng/mL


(0.000-0.056) 


  0.101 ng/mL


(0.000-0.056)


 


Total Protein


  7.2 G/DL


(6.4-8.2) 


  7.0 G/DL


(6.4-8.2)


 


Albumin


  2.6 G/DL


(3.4-5.0)  L 


  2.4 G/DL


(3.4-5.0)  L


 


Globulin 4.6 g/dL    4.6 g/dL  


 


Albumin/Globulin Ratio


  0.6 (1.0-2.7)


L 


  0.5 (1.0-2.7)


L


 


Digoxin Level


  1.7 NG/ML


(0.5-2.0) 


  0.9 NG/ML


(0.5-2.0)


 


Urine Random Sodium


  


  < 20 mmol/L


()  L 


 


 


Hemoglobin A1c


  


  


  4.6 %


(4.3-6.0)


 


Lactic Acid Level


  


  


  1.20 mmol/L


(0.4-2.0)


 


Uric Acid


  


  


  5.9 MG/DL


(2.6-7.2)


 


Iron Level


  


  


  45 ug/dL


()  L


 


Total Iron Binding Capacity


  


  


  138 ug/dL


(250-450)  L


 


Percent Iron Saturation   33 % (15-50)  


 


Unsaturated Iron Binding


  


  


  93 ug/dL


(112-346)  L


 


Ferritin


  


  


  1821 NG/ML


(8-388)  H


 


Gamma Glutamyl Transpeptidase   77 U/L (5-85)  


 


Lactate Dehydrogenase


  


  


  291 U/L


()  H


 


C-Reactive Protein,


Quantitative 


  


  33.6 mg/dL


(0.00-0.90)  H


 


Pro-B-Type Natriuretic Peptide


  


  


  5503 pg/mL


(0-125)  H


 


Triglycerides Level


  


  


  67 MG/DL


()


 


Cholesterol Level


  


  


  87 MG/DL (<


200)


 


LDL Cholesterol


  


  


  40 mg/dL


(<100)


 


HDL Cholesterol


  


  


  31 MG/DL


(40-60)  L


 


Cholesterol/HDL Ratio


  


  


  2.8 (3.3-4.4)


L


 


Vitamin B12 Level


  


  


  1361 PG/ML


(193-986)  H


 


Folate


  


  


  10.7 NG/ML


(8.6-58.9)


 


Thyroid Stimulating Hormone


(TSH) 


  


  2.209 uiU/mL


(0.358-3.740)











Microbiology








 Date/Time


Source Procedure


Growth Status


 


 


 5/16/20 21:40


Urine,Clean Catch Urine Culture - Preliminary


Gram Negative Bacillus 1 Resulted


 


 5/17/20 06:50


Rectum  Received








Objective





HEAD AND NECK:  Showed no JVD.


LUNGS:  Coarse rhonchi.


CARDIOVASCULAR:  Regular S1 and S2 with no gallop.


ABDOMEN:  Soft.


EXTREMITIES:  No pitting edema.











Jake Lan MD May 18, 2020 09:25

## 2020-05-18 NOTE — NUR
NURSE NOTES:WOUND CARE NOTES:Pt presented on admission with non-blanching erythema sacrum,R 
and L gluteal cheeks.Darker skin tone without erythema or induration R ischium.Non-blanching 
erythema L Heel. No other skin concerns noted.



Tx.Plan: Apply Moisture Barrier Paste to Buttocks. Cover Sacrum with Optifoam. Change every 
3 days and prn.

            Apply Moisture Barrier Paste to Bilat groin,R and L Ischial tuberosities with 
each incontinence care.

            Apply Cavilon Skin Barrier to both heels. Cover each heel with Optifoam drsg. 
Change every 7 days and prn.

            Reposition at least every 2houors or as tolerated.

            Place pillow between knees.

            Off-load heels with Pillow.

## 2020-05-18 NOTE — DIAGNOSTIC IMAGING REPORT
Procedure: XRAY Chest 1v

Reason for study: Reason For Exam: SOB

 

Comparison films:  5/16/2020.

 

FINDINGS:

 

A single one view chest is obtained. Vascularity is normal. Bibasilar infiltrates

unchanged. Cardiomegaly and right effusion unchanged. The bony thorax appear

unremarkable.

 

IMPRESSION:  

 

NO SIGNIFICANT CHANGE COMPARED TO PREVIOUS EXAM.

## 2020-05-18 NOTE — PULMONOLOGY PROGRESS NOTE
Subjective


Allergies:  


Coded Allergies:  


     No Known Allergies (Unverified , 5/16/20)


All Systems:  reviewed and negative except above


Subjective


fevers resolved


no leuk 


on o2 2 L via NC no signs of resp distress 


in isolation fOR  suspected CoviD 19





Objective





Last 24 Hour Vital Signs








  Date Time  Temp Pulse Resp B/P (MAP) Pulse Ox O2 Delivery O2 Flow Rate FiO2


 


5/18/20 10:03  68  165/81    


 


5/18/20 10:02  68  165/81    


 


5/18/20 10:02  68      


 


5/18/20 07:21    148/86    


 


5/18/20 07:21    148/86    


 


5/18/20 04:00 98.2 56 20 161/89 (113) 98   


 


5/18/20 01:00    161/84    


 


5/17/20 23:29  69  149/72    


 


5/17/20 23:28    149/72    


 


5/17/20 21:00      Nasal Cannula 4.0 


 


5/17/20 20:00 96.1 64 19 149/72 (97) 94   


 


5/17/20 17:36    151/78    


 


5/17/20 16:00 98.6 61 22 151/78 (102) 96   


 


5/17/20 15:49  67      


 


5/17/20 14:25    149/77    


 


5/17/20 12:21    149/77    


 


5/17/20 12:00 98.8 69 20 149/77 (101) 95   


 


5/17/20 11:24  71      

















Intake and Output  


 


 5/17/20 5/18/20





 19:00 07:00


 


Intake Total  1150 ml


 


Output Total 200 ml 


 


Balance -200 ml 1150 ml


 


  


 


IV Total  1150 ml


 


Output Urine Total 200 ml 


 


# Voids 1 








Objective


General Appearance:  no apparent distress, awake, somewhat responsive AA male 

in NAD


Lines, tubes and drains:  peripheral


HEENT:  normocephalic, atraumatic, anicteric, mucous membranes moist


Neck:  supple


Respiratory/Chest:  no accessory muscle use, rhonchi  - left - few, rhonchi - 

right - diffused


Cardiovascular/Chest:  normal rate


Abdomen:  normal bowel sounds, non tender, soft


Extremities:  no calf tenderness, no edema


Neurologic:  abnormal gait, alert  but  somewhat responsive, , R side weakness


Musculoskeletal:  atrophy - BLE





Microbiology








 Date/Time


Source Procedure


Growth Status


 


 


 5/16/20 21:40


Urine,Clean Catch Urine Culture - Preliminary


Gram Negative Bacillus 1 Resulted


 


 5/17/20 06:50


Rectum  Received








Laboratory Tests


5/17/20 10:35: 


White Blood Count 5.7, Red Blood Count 4.62L, Hemoglobin 14.8, Hematocrit 42.6, 

Mean Corpuscular Volume 92, Mean Corpuscular Hemoglobin 32.0H, Mean Corpuscular 

Hemoglobin Concent 34.7, Red Cell Distribution Width 11.3L, Platelet Count 121L

, Mean Platelet Volume 6.3L, Neutrophils (%) (Auto) 83.8H, Lymphocytes (%) (Auto

) 9.2L, Monocytes (%) (Auto) 6.6, Eosinophils (%) (Auto) 0.0, Basophils (%) (

Auto) 0.4, Sodium Level 147H, Potassium Level 3.7, Chloride Level 110H, Carbon 

Dioxide Level 25, Anion Gap 12, Blood Urea Nitrogen 46H, Creatinine 1.9H, 

Estimat Glomerular Filtration Rate 41.6, Glucose Level 147H, Calcium Level 8.6, 

Phosphorus Level 3.3, Magnesium Level 2.2, Total Bilirubin 1.8H, Direct 

Bilirubin 1.1H, Aspartate Amino Transf (AST/SGOT) 80H, Alanine Aminotransferase 

(ALT/SGPT) 59, Alkaline Phosphatase 108, Troponin I 0.180H, Total Protein 7.2, 

Albumin 2.6L, Globulin 4.6, Albumin/Globulin Ratio 0.6L, Digoxin Level 1.7


5/17/20 11:30: Urine Random Sodium < 20L


5/18/20 06:25: 


White Blood Count 6.0, Red Blood Count 4.62L, Hemoglobin 14.6, Hematocrit 42.3, 

Mean Corpuscular Volume 91, Mean Corpuscular Hemoglobin 31.6H, Mean Corpuscular 

Hemoglobin Concent 34.6, Red Cell Distribution Width 11.3L, Platelet Count 121L

, Mean Platelet Volume 6.5, Neutrophils (%) (Auto) 80.7H, Lymphocytes (%) (Auto

) 11.4L, Monocytes (%) (Auto) 7.1, Eosinophils (%) (Auto) 0.1, Basophils (%) (

Auto) 0.7, Sodium Level 142, Potassium Level 3.7, Chloride Level 108H, Carbon 

Dioxide Level 24, Anion Gap 10, Blood Urea Nitrogen 42H, Creatinine 1.7H, 

Estimat Glomerular Filtration Rate 47.3, Glucose Level 100, Calcium Level 8.4L, 

Phosphorus Level 3.0, Magnesium Level 2.2, Total Bilirubin 1.5H, Direct 

Bilirubin 0.9H, Aspartate Amino Transf (AST/SGOT) 72H, Alanine Aminotransferase 

(ALT/SGPT) 59, Alkaline Phosphatase 111, Troponin I 0.101H, Total Protein 7.0, 

Albumin 2.4L, Globulin 4.6, Albumin/Globulin Ratio 0.5L, Digoxin Level 0.9, 

Hemoglobin A1c 4.6, Lactic Acid Level 1.20, Uric Acid 5.9, Iron Level 45L, 

Total Iron Binding Capacity 138L, Percent Iron Saturation 33, Unsaturated Iron 

Binding 93L, Ferritin 1821H, Gamma Glutamyl Transpeptidase 77, Lactate 

Dehydrogenase 291H, C-Reactive Protein, Quantitative 33.6H, Pro-B-Type 

Natriuretic Peptide 5503H, Triglycerides Level 67, Cholesterol Level 87, LDL 

Cholesterol 40, HDL Cholesterol 31L, Cholesterol/HDL Ratio 2.8L, Vitamin B12 

Level 1361H, Folate 10.7, Thyroid Stimulating Hormone (TSH) 2.209





Current Medications








 Medications


  (Trade)  Dose


 Ordered  Sig/Michael


 Route


 PRN Reason  Start Time


 Stop Time Status Last Admin


Dose Admin


 


 Acetaminophen


  (Tylenol)  650 mg  Q6H  PRN


 ORAL


 mild pain/fever  5/17/20 08:48


 6/16/20 08:47   


 


 


 Albuterol Sulfate


  (Proventil MDI)  2 puff  Q4H  PRN


 INH


 Shortness of Breath  5/17/20 07:30


 8/15/20 07:29   


 


 


 Apixaban


  (Eliquis)  2.5 mg  BID


 ORAL


   5/18/20 18:00


 8/16/20 17:59   


 


 


 Artificial Tears


  (Akwa-Tears)  1 drop  BID


 BOTH EYES


   5/17/20 09:00


 6/16/20 08:59  5/18/20 10:05


 


 


 Aspirin


  (Ecotrin)  81 mg  DAILY


 ORAL


   5/17/20 09:00


 7/1/20 08:59  5/18/20 10:03


 


 


 Atorvastatin


 Calcium


  (Lipitor)  20 mg  BEDTIME


 ORAL


   5/17/20 21:00


 8/15/20 20:59  5/17/20 23:29


 


 


 Azithromycin 500


 mg/Dextrose  275 ml @ 


 275 mls/hr  Q24H


 IV


   5/17/20 21:00


 5/23/20 21:59  5/17/20 21:00


 


 


 Bisacodyl


  (Dulcolax)  10 mg  PRN


 RECTAL


   5/17/20 00:15


 8/15/20 00:14   


 


 


 Cefepime HCl 1 gm/


 Dextrose  50 ml @ 


 100 mls/hr  EVERY 12  HOURS


 IVPB


   5/17/20 09:00


 5/24/20 08:59  5/18/20 10:01


 


 


 Clonidine HCl


  (Catapres Tab)  0.1 mg  Q4H  PRN


 ORAL


 sbp>160  5/17/20 08:48


 8/15/20 08:47   


 


 


 Dextrose


  (Dextrose 50%)  25 ml  Q30M  PRN


 IV


 Hypoglycemia  5/17/20 00:15


 8/15/20 00:14   


 


 


 Dextrose


  (Dextrose 50%)  50 ml  Q30M  PRN


 IV


 Hypoglycemia  5/17/20 00:15


 8/15/20 00:14   


 


 


 Dextrose/


 Electrolytes  1,000 ml @ 


 75 mls/hr  [id number]


 IV


   5/17/20 01:00


 6/16/20 00:59  5/18/20 03:40


 


 


 Digoxin


  (Lanoxin)  0.125 mg  DAILY


 ORAL


   5/17/20 09:00


 8/15/20 08:59  5/18/20 10:02


 


 


 Diltiazem HCl


  (Cardizem CD)  180 mg  DAILY


 ORAL


   5/17/20 09:00


 6/16/20 08:59  5/18/20 10:03


 


 


 Docusate Sodium


  (Colace)  250 mg  TWICE A  DAY


 ORAL


   5/17/20 09:00


 6/16/20 08:59  5/18/20 10:03


 


 


 Hydralazine HCl


  (Apresoline)  25 mg  EVERY 8  HOURS


 ORAL


   5/17/20 06:00


 8/15/20 05:59  5/18/20 07:21


 


 


 Isosorbide


 Dinitrate


  (Isordil)  10 mg  Q6HR


 ORAL


   5/17/20 06:00


 6/16/20 05:59  5/18/20 07:21


 


 


 Metoprolol


 Tartrate


  (Lopressor)  25 mg  EVERY 12  HOURS


 ORAL


   5/17/20 21:00


 8/15/20 20:59  5/18/20 10:02


 


 


 Mineral Oil


  (Fleet's Mineral


 Oil Enema)  133 ml  DAILYPRN  PRN


 RECTAL


 CONSTIPATION  5/17/20 05:58


 6/16/20 05:57   


 


 


 Multivitamins


 Therapeutic


  (Therapeutic


 Multivitamin)  1 ea  DAILY


 ORAL


   5/17/20 09:00


 6/16/20 08:59  5/18/20 10:00


 


 


 Sorbitol


  (sorbitoL)  30 ml  EVERY 6  HOURS


 ORAL


   5/17/20 12:00


 6/16/20 11:59  5/18/20 07:21


 











Assessment/Plan


Assessment/Plan


ASSESSMENT


sepsis


acute hypoxemic resp failure ( requiring high flow of O2 and manifested with 

tachypnea)


PNA


suspected CoVID 19 Infection


elevated troponin, possible NSTEMI


possible CHF 


A FIB 


HTN urgency, on admission


LIZA vs CKD 


transaminitis   





PLAN OF CARE 


tele 


isolation


02 titrate to keep sat above 92%


MDI Proventil prn  until known  CoVID result


empiric abx


SCX if able


fup with CXR 


DVT prophylaxis 


fup with SARS-CoV2 by PCR 


noted elevated CRP, D dimer, ferritin, LDH, trend further


serial troponin elevated 


cardio on board 


ECHO  with pEF and evidecne of moderate pulm HTN


Venous Duplex BLE


resume home meds , including ASA and statin


continue Hydralazine,  Digoxin and Isordil for CHF, monitor volumes


hydration, monitor e/lytes, renal parameters, avoid nephrotoxics


BP management with current regimen and optimize further as needed


in A fib, rate controlled with Dig and Cardizem, 


cardio started on Eliquis, heparin  SQ stopped


fup with LFT 


DNR/DNI status 





case discussed and evaluated by supervising physician











Arelis Bergman NP May 18, 2020 10:34

## 2020-05-18 NOTE — NUR
*-* INSURANCE *-*



ALL AVAILABLE CLINICALS HAVE BEEN FAXED TO:



[employer] ([place]) 

[phone]   Work

[phone]   Fax

[phone]   FAX

[phone]   FAX

## 2020-05-18 NOTE — NUR
NURSE NOTES:

Received report from Valerie/RN, Patient is asleep, lying semi-grove's, resting comfortably. 
On 4L nasal canula, no acute distress/SOB noted. Denies pain at this time. IV on Left FA 
patent, no bleeding or infiltration noted. Bed in low position and locked. Call light within 
reach. Encouraged to use call light when needed. Will continue plan of care.

## 2020-05-19 VITALS — SYSTOLIC BLOOD PRESSURE: 184 MMHG | DIASTOLIC BLOOD PRESSURE: 92 MMHG

## 2020-05-19 VITALS — DIASTOLIC BLOOD PRESSURE: 83 MMHG | SYSTOLIC BLOOD PRESSURE: 172 MMHG

## 2020-05-19 VITALS — DIASTOLIC BLOOD PRESSURE: 87 MMHG | SYSTOLIC BLOOD PRESSURE: 159 MMHG

## 2020-05-19 VITALS — DIASTOLIC BLOOD PRESSURE: 90 MMHG | SYSTOLIC BLOOD PRESSURE: 177 MMHG

## 2020-05-19 VITALS — SYSTOLIC BLOOD PRESSURE: 199 MMHG | DIASTOLIC BLOOD PRESSURE: 113 MMHG

## 2020-05-19 VITALS — DIASTOLIC BLOOD PRESSURE: 104 MMHG | SYSTOLIC BLOOD PRESSURE: 156 MMHG

## 2020-05-19 LAB
ADD MANUAL DIFF: NO
ANION GAP SERPL CALC-SCNC: 11 MMOL/L (ref 5–15)
BASOPHILS NFR BLD AUTO: 0.6 % (ref 0–2)
BUN SERPL-MCNC: 39 MG/DL (ref 7–18)
CALCIUM SERPL-MCNC: 9 MG/DL (ref 8.5–10.1)
CHLORIDE SERPL-SCNC: 104 MMOL/L (ref 98–107)
CO2 SERPL-SCNC: 24 MMOL/L (ref 21–32)
CREAT SERPL-MCNC: 1.7 MG/DL (ref 0.55–1.3)
EOSINOPHIL NFR BLD AUTO: 0.1 % (ref 0–3)
ERYTHROCYTE [DISTWIDTH] IN BLOOD BY AUTOMATED COUNT: 11.1 % (ref 11.6–14.8)
HCT VFR BLD CALC: 44.1 % (ref 42–52)
HGB BLD-MCNC: 15.3 G/DL (ref 14.2–18)
LYMPHOCYTES NFR BLD AUTO: 15.4 % (ref 20–45)
MCV RBC AUTO: 91 FL (ref 80–99)
MONOCYTES NFR BLD AUTO: 10.1 % (ref 1–10)
NEUTROPHILS NFR BLD AUTO: 73.9 % (ref 45–75)
PLATELET # BLD: 131 K/UL (ref 150–450)
POTASSIUM SERPL-SCNC: 4.4 MMOL/L (ref 3.5–5.1)
RBC # BLD AUTO: 4.82 M/UL (ref 4.7–6.1)
SODIUM SERPL-SCNC: 139 MMOL/L (ref 136–145)
WBC # BLD AUTO: 3.9 K/UL (ref 4.8–10.8)

## 2020-05-19 RX ADMIN — HYDRALAZINE HYDROCHLORIDE SCH MG: 50 TABLET ORAL at 21:57

## 2020-05-19 RX ADMIN — APIXABAN SCH MG: 2.5 TABLET, FILM COATED ORAL at 17:52

## 2020-05-19 RX ADMIN — DOCUSATE SODIUM SCH MG: 250 CAPSULE, LIQUID FILLED ORAL at 09:00

## 2020-05-19 RX ADMIN — DEXTROSE, SODIUM CHLORIDE, AND POTASSIUM CHLORIDE SCH MLS/HR: 5; .45; .15 INJECTION INTRAVENOUS at 06:14

## 2020-05-19 RX ADMIN — TAMSULOSIN HYDROCHLORIDE SCH MG: 0.4 CAPSULE ORAL at 17:52

## 2020-05-19 RX ADMIN — HYDRALAZINE HYDROCHLORIDE SCH MG: 25 TABLET ORAL at 05:33

## 2020-05-19 RX ADMIN — APIXABAN SCH MG: 2.5 TABLET, FILM COATED ORAL at 09:22

## 2020-05-19 RX ADMIN — DIGOXIN SCH MG: 0.12 TABLET ORAL at 09:00

## 2020-05-19 RX ADMIN — Medication SCH EA: at 09:22

## 2020-05-19 RX ADMIN — DILTIAZEM HYDROCHLORIDE SCH MG: 180 CAPSULE, EXTENDED RELEASE ORAL at 09:22

## 2020-05-19 RX ADMIN — ATORVASTATIN CALCIUM SCH MG: 20 TABLET, FILM COATED ORAL at 21:55

## 2020-05-19 RX ADMIN — SORBITOL SOLUTION (BULK) SCH ML: 70 SOLUTION at 17:53

## 2020-05-19 RX ADMIN — SORBITOL SOLUTION (BULK) SCH ML: 70 SOLUTION at 12:00

## 2020-05-19 RX ADMIN — ASPIRIN SCH MG: 81 TABLET, DELAYED RELEASE ORAL at 09:22

## 2020-05-19 RX ADMIN — DOCUSATE SODIUM SCH MG: 250 CAPSULE, LIQUID FILLED ORAL at 17:53

## 2020-05-19 RX ADMIN — SORBITOL SOLUTION (BULK) SCH ML: 70 SOLUTION at 00:00

## 2020-05-19 RX ADMIN — SORBITOL SOLUTION (BULK) SCH ML: 70 SOLUTION at 06:00

## 2020-05-19 RX ADMIN — AZITHROMYCIN DIHYDRATE SCH MLS/HR: 500 INJECTION, POWDER, LYOPHILIZED, FOR SOLUTION INTRAVENOUS at 21:55

## 2020-05-19 NOTE — INTERNAL MED PROGRESS NOTE
Subjective


Date of Service:  May 19, 2020


Physician Name


Derek Ortez


Attending Physician


Urban Brown MD





Current Medications








 Medications


  (Trade)  Dose


 Ordered  Sig/Michael


 Route


 PRN Reason  Start Time


 Stop Time Status Last Admin


Dose Admin


 


 Acetaminophen


  (Tylenol)  650 mg  Q6H  PRN


 ORAL


 mild pain/fever  5/17/20 08:48


 6/16/20 08:47   


 


 


 Albuterol Sulfate


  (Proventil MDI)  2 puff  Q4H  PRN


 INH


 Shortness of Breath  5/17/20 07:30


 8/15/20 07:29   


 


 


 Apixaban


  (Eliquis)  2.5 mg  BID


 ORAL


   5/18/20 18:00


 8/16/20 17:59  5/19/20 09:22


 


 


 Artificial Tears


  (Akwa-Tears)  1 drop  BID


 BOTH EYES


   5/17/20 09:00


 6/16/20 08:59  5/19/20 09:24


 


 


 Aspirin


  (Ecotrin)  81 mg  DAILY


 ORAL


   5/17/20 09:00


 7/1/20 08:59  5/19/20 09:22


 


 


 Atorvastatin


 Calcium


  (Lipitor)  20 mg  BEDTIME


 ORAL


   5/17/20 21:00


 8/15/20 20:59  5/18/20 21:43


 


 


 Azithromycin 500


 mg/Dextrose  275 ml @ 


 275 mls/hr  Q24H


 IV


   5/17/20 21:00


 5/23/20 21:59  5/18/20 21:43


 


 


 Bisacodyl


  (Dulcolax)  10 mg  PRN


 RECTAL


   5/17/20 00:15


 8/15/20 00:14   


 


 


 Cefepime HCl 1 gm/


 Dextrose  50 ml @ 


 100 mls/hr  EVERY 12  HOURS


 IVPB


   5/17/20 09:00


 5/24/20 08:59  5/19/20 09:24


 


 


 Dextrose


  (Dextrose 50%)  25 ml  Q30M  PRN


 IV


 Hypoglycemia  5/17/20 00:15


 8/15/20 00:14   


 


 


 Dextrose


  (Dextrose 50%)  50 ml  Q30M  PRN


 IV


 Hypoglycemia  5/17/20 00:15


 8/15/20 00:14   


 


 


 Digoxin


  (Lanoxin)  0.125 mg  DAILY


 ORAL


   5/17/20 09:00


 8/15/20 08:59  5/18/20 10:02


 


 


 Diltiazem HCl


  (Cardizem CD)  180 mg  DAILY


 ORAL


   5/17/20 09:00


 6/16/20 08:59  5/19/20 09:22


 


 


 Docusate Sodium


  (Colace)  250 mg  TWICE A  DAY


 ORAL


   5/17/20 09:00


 6/16/20 08:59  5/18/20 17:12


 


 


 Hydralazine HCl


  (Apresoline)  100 mg  EVERY 8  HOURS


 ORAL


   5/19/20 22:00


 8/15/20 05:59   


 


 


 Isosorbide


 Dinitrate


  (Isordil)  10 mg  Q6HR


 ORAL


   5/17/20 06:00


 6/16/20 05:59  5/19/20 11:22


 


 


 Metoprolol


 Tartrate


  (Lopressor)  25 mg  EVERY 12  HOURS


 ORAL


   5/17/20 21:00


 8/15/20 20:59  5/18/20 21:44


 


 


 Mineral Oil


  (Fleet's Mineral


 Oil Enema)  133 ml  DAILYPRN  PRN


 RECTAL


 CONSTIPATION  5/17/20 05:58


 6/16/20 05:57   


 


 


 Minoxidil


  (Loniten)  2.5 mg  Q4H  PRN


 ORAL


 BP over 170 syst and 100 diast  5/19/20 14:10


 8/17/20 14:09   


 


 


 Multivitamins


 Therapeutic


  (Therapeutic


 Multivitamin)  1 ea  DAILY


 ORAL


   5/17/20 09:00


 6/16/20 08:59  5/19/20 09:22


 


 


 Sorbitol


  (sorbitoL)  30 ml  EVERY 6  HOURS


 ORAL


   5/17/20 12:00


 6/16/20 11:59  5/18/20 17:13


 


 


 Tamsulosin HCl


  (Flomax)  0.4 mg  BID


 ORAL


   5/19/20 18:00


 6/18/20 17:59   


 








Allergies:  


Coded Allergies:  


     No Known Allergies (Unverified , 5/16/20)


ROS Limited/Unobtainable:  Yes


Subjective


81 YO M admitted with shortness of breath.  Now respiratory failure.  Cover for 

Int Kiko-DR Brown





Objective





Last Vital Signs








  Date Time  Temp Pulse Resp B/P (MAP) Pulse Ox O2 Delivery O2 Flow Rate FiO2


 


5/19/20 13:09    199/113    


 


5/19/20 12:00  67      


 


5/19/20 12:00 97.9  20  95   


 


5/19/20 09:00      Nasal Cannula 4.0 











Laboratory Tests








Test


  5/19/20


04:30


 


White Blood Count


  3.9 K/UL


(4.8-10.8)  L


 


Red Blood Count


  4.82 M/UL


(4.70-6.10)


 


Hemoglobin


  15.3 G/DL


(14.2-18.0)


 


Hematocrit


  44.1 %


(42.0-52.0)


 


Mean Corpuscular Volume 91 FL (80-99)  


 


Mean Corpuscular Hemoglobin


  31.8 PG


(27.0-31.0)  H


 


Mean Corpuscular Hemoglobin


Concent 34.7 G/DL


(32.0-36.0)


 


Red Cell Distribution Width


  11.1 %


(11.6-14.8)  L


 


Platelet Count


  131 K/UL


(150-450)  L


 


Mean Platelet Volume


  7.5 FL


(6.5-10.1)


 


Neutrophils (%) (Auto)


  73.9 %


(45.0-75.0)


 


Lymphocytes (%) (Auto)


  15.4 %


(20.0-45.0)  L


 


Monocytes (%) (Auto)


  10.1 %


(1.0-10.0)  H


 


Eosinophils (%) (Auto)


  0.1 %


(0.0-3.0)


 


Basophils (%) (Auto)


  0.6 %


(0.0-2.0)


 


Sodium Level


  139 MMOL/L


(136-145)


 


Potassium Level


  4.4 MMOL/L


(3.5-5.1)


 


Chloride Level


  104 MMOL/L


()


 


Carbon Dioxide Level


  24 MMOL/L


(21-32)


 


Anion Gap


  11 mmol/L


(5-15)


 


Blood Urea Nitrogen


  39 mg/dL


(7-18)  H


 


Creatinine


  1.7 MG/DL


(0.55-1.30)  H


 


Estimat Glomerular Filtration


Rate 47.3 mL/min


(>60)


 


Glucose Level


  104 MG/DL


()


 


Calcium Level


  9.0 MG/DL


(8.5-10.1)


 


Digoxin Level


  1.1 NG/ML


(0.5-2.0)











Microbiology








 Date/Time


Source Procedure


Growth Status


 


 


 5/17/20 06:50


Nasal Nares MRSA Culture - Final


NO METHICILLIN RESISTANT STAPH AUREUS... Complete


 


 5/16/20 21:40


Urine,Clean Catch Urine Culture - Final


Citrobacter Koseri Complete


 


 5/17/20 21:00


Rectum VRE Culture - Final


NO VANCOMYCIN RESISTANT ENTEROCOCCUS ... Complete


 


 5/17/20 06:50


Rectum  Received

















Intake and Output  


 


 5/18/20 5/19/20





 19:00 07:00


 


Intake Total 360 ml 


 


Output Total 600 ml 


 


Balance -240 ml 


 


  


 


Intake Oral 360 ml 


 


Output Urine Total 600 ml 


 


# Bowel Movements 1 








Objective


PHYSICAL EXAMINATION:


GENERAL:  The patient awake, responsive, no acute distress.


HEENT:  Pupils are equal and reactive to light.  Extraocular movements


intact.  Neck was supple.  No JVD.


LUNGS:  Good air entry.  No wheezing or rales. Decreased in bases.


HEART:  S1 and S2.  Distant heart sounds.  No murmur or gallops.


ABDOMEN:  Soft, nondistended, nontender. Positive bowel sounds.


EXTREMITIES:  No cyanosis, clubbing, edema.


NEUROLOGIC:  Cranial nerves II through XII grossly normal.  The patient


moving left side upper extremities and lower extremity.  Right upper


extremity contracture.  Right lower extremity is decreased motor.





Assessment/Plan


Assessment/Plan


ASSESSMENT:


1. Right lower lobe pneumonia.


2. Acute hypoxemic respiratory failure.


3. Await COVID-19 test result


4. Acute kidney injury on chronic renal insufficiency.


5. Sepsis.


6. Hypertension.


7. Dyslipidemia.


8. History of CVA with right hemiparesis.


9.  Urinary tract infection=Citrobacter Koseri





Plan:


1. Admit to telemetry on the COVID-19 isolation.  We will follow up with


COVID-19  cultures and follow up with urine culture and blood culture.


Monitor laboratory, IV hydration, and start the patient on broad-spectrum


antibiotic with azithromycin and cefepime.


2. DVT prophylaxis on Lovenox.


3. Code status is DNR.


4. We will follow up with Dr. Lan from Cardiology Electrophysiology,


Dr. Barcenas, Pulmonary Critical Care, Dr. Mello from Nephrology, and


Dr. Gentile from Infectious Diseases consultations.


5.  Non ST elevated myocardial infarction/elevated troponin


6.  ABX=azithromycin and cefepime











Derek Ortez MD May 19, 2020 16:18

## 2020-05-19 NOTE — NUR
NURSE NOTES:

Received patient report from LISA Gan. Patient is asleep in bed. He shows no signs of 
distress or pain at the time. IV checked and patent. There are no signs of erythema, 
infiltration, or bleeding. Bed in the lowest position, call light within reach, side rails 
up x 3. Will continue plan of care.

## 2020-05-19 NOTE — NUR
CASE MANAGEMENT:REVIEW



5/19/20

SI: SEPSIS. RLL PNA

AC/CHR RENAL FAILURE. SUSPECTED COVID 19

97.9   69  20  199/113  95% ON 4L/NC

WBC-3.9     BUN+39   CR+1.7  



IS: IV AZITHROMYCIN Q24

IV CEFEPIME Q12

HYDRALAZINE PO Q8HRS

FLOMAX PO BID

ELIQUIS PO BID 

LIPITOR PO QHS

LOPRESSOR PO Q12

ASA PO QD

CARDIZEM PO QD

DIGOXIN PO QD

**: TELEMETRY STATUS

DCP: PATIENT IS FROM [facility]



PLAN:

TRANSFER TO [facility] ONCE COVID RESULTED

CALLED [facility] AND SPOKE WITH BARON.MADE HER AWARE OF PENDING RESULTS

PER BARON THE ASSIGNED  IS DALAI

## 2020-05-19 NOTE — PULMONOLOGY PROGRESS NOTE
Subjective


ROS Limited/Unobtainable:  No


Constitutional:  Reports: no symptoms


HEENT:  Repors: no symptoms


Allergies:  


Coded Allergies:  


     No Known Allergies (Unverified , 5/16/20)


All Systems:  reviewed and negative except above





Objective





Last 24 Hour Vital Signs








  Date Time  Temp Pulse Resp B/P (MAP) Pulse Ox O2 Delivery O2 Flow Rate FiO2


 


5/19/20 12:00  67      


 


5/19/20 12:00 97.9 69 20 199/113 (141) 95   


 


5/19/20 11:22    199/113    


 


5/19/20 11:22    199/113    


 


5/19/20 09:22  55  156/104    


 


5/19/20 09:00  55  156/104    


 


5/19/20 09:00  55      


 


5/19/20 09:00      Nasal Cannula 4.0 


 


5/19/20 08:00 97.6 55 20 156/104 (121) 98   


 


5/19/20 08:00  57      


 


5/19/20 06:00 97.5 57 20 184/92 (122) 95   


 


5/19/20 05:33    183/82    


 


5/19/20 05:33    183/82    


 


5/19/20 04:00  79      


 


5/19/20 04:00 97.5 57 20 184/92 (122) 95   


 


5/19/20 00:11    159/76    


 


5/19/20 00:00 98.5 55 20 172/83 (112) 97   


 


5/18/20 21:44  59  159/76    


 


5/18/20 21:44    159/76    


 


5/18/20 21:00      Nasal Cannula 4.0 


 


5/18/20 20:00  57      


 


5/18/20 20:00 97.5 59 20 159/76 (103) 96   


 


5/18/20 17:12    148/73    


 


5/18/20 16:00 98.2 54 18 148/73 (98) 95   


 


5/18/20 16:00  71      


 


5/18/20 14:31    158/75    


 


5/18/20 12:57    158/75    

















Intake and Output  


 


 5/18/20 5/19/20





 19:00 07:00


 


Intake Total 360 ml 


 


Output Total 600 ml 


 


Balance -240 ml 


 


  


 


Intake Oral 360 ml 


 


Output Urine Total 600 ml 


 


# Bowel Movements 1 








General Appearance:  WD/WN


HEENT:  normocephalic


Respiratory:  rhonchi - left, rhonchi - right


Cardiovascular:  normal peripheral pulses, normal rate, regular rhythm


Abdomen:  normal bowel sounds, soft, non tender


Genitourinary:  normal external genitalia


Extremities:  no cyanosis


Skin:  no rash


Neurologic:  CNs II-XII grossly normal, no motor/sensory deficits





Microbiology








 Date/Time


Source Procedure


Growth Status


 


 


 5/17/20 06:50


Nasal Nares MRSA Culture - Final


NO METHICILLIN RESISTANT STAPH AUREUS... Complete


 


 5/16/20 21:40


Urine,Clean Catch Urine Culture - Preliminary


Gram Negative Bacillus 1 Resulted


 


 5/17/20 06:50


Rectum  Received








Laboratory Tests


5/19/20 04:30: 


White Blood Count 3.9L, Red Blood Count 4.82, Hemoglobin 15.3, Hematocrit 44.1, 

Mean Corpuscular Volume 91, Mean Corpuscular Hemoglobin 31.8H, Mean Corpuscular 

Hemoglobin Concent 34.7, Red Cell Distribution Width 11.1L, Platelet Count 131L

, Mean Platelet Volume 7.5, Neutrophils (%) (Auto) 73.9, Lymphocytes (%) (Auto) 

15.4L, Monocytes (%) (Auto) 10.1H, Eosinophils (%) (Auto) 0.1, Basophils (%) (

Auto) 0.6, Sodium Level 139, Potassium Level 4.4, Chloride Level 104, Carbon 

Dioxide Level 24, Anion Gap 11, Blood Urea Nitrogen 39H, Creatinine 1.7H, 

Estimat Glomerular Filtration Rate 47.3, Glucose Level 104, Calcium Level 9.0, 

Digoxin Level 1.1





Current Medications








 Medications


  (Trade)  Dose


 Ordered  Sig/Michael


 Route


 PRN Reason  Start Time


 Stop Time Status Last Admin


Dose Admin


 


 Acetaminophen


  (Tylenol)  650 mg  Q6H  PRN


 ORAL


 mild pain/fever  5/17/20 08:48


 6/16/20 08:47   


 


 


 Albuterol Sulfate


  (Proventil MDI)  2 puff  Q4H  PRN


 INH


 Shortness of Breath  5/17/20 07:30


 8/15/20 07:29   


 


 


 Apixaban


  (Eliquis)  2.5 mg  BID


 ORAL


   5/18/20 18:00


 8/16/20 17:59  5/19/20 09:22


 


 


 Artificial Tears


  (Akwa-Tears)  1 drop  BID


 BOTH EYES


   5/17/20 09:00


 6/16/20 08:59  5/19/20 09:24


 


 


 Aspirin


  (Ecotrin)  81 mg  DAILY


 ORAL


   5/17/20 09:00


 7/1/20 08:59  5/19/20 09:22


 


 


 Atorvastatin


 Calcium


  (Lipitor)  20 mg  BEDTIME


 ORAL


   5/17/20 21:00


 8/15/20 20:59  5/18/20 21:43


 


 


 Azithromycin 500


 mg/Dextrose  275 ml @ 


 275 mls/hr  Q24H


 IV


   5/17/20 21:00


 5/23/20 21:59  5/18/20 21:43


 


 


 Bisacodyl


  (Dulcolax)  10 mg  PRN


 RECTAL


   5/17/20 00:15


 8/15/20 00:14   


 


 


 Cefepime HCl 1 gm/


 Dextrose  50 ml @ 


 100 mls/hr  EVERY 12  HOURS


 IVPB


   5/17/20 09:00


 5/24/20 08:59  5/19/20 09:24


 


 


 Clonidine HCl


  (Catapres Tab)  0.1 mg  Q4H  PRN


 ORAL


 sbp>160  5/17/20 08:48


 8/15/20 08:47  5/19/20 11:22


 


 


 Dextrose


  (Dextrose 50%)  25 ml  Q30M  PRN


 IV


 Hypoglycemia  5/17/20 00:15


 8/15/20 00:14   


 


 


 Dextrose


  (Dextrose 50%)  50 ml  Q30M  PRN


 IV


 Hypoglycemia  5/17/20 00:15


 8/15/20 00:14   


 


 


 Dextrose/


 Electrolytes  1,000 ml @ 


 75 mls/hr  [id number]


 IV


   5/17/20 01:00


 6/16/20 00:59  5/18/20 17:13


 


 


 Digoxin


  (Lanoxin)  0.125 mg  DAILY


 ORAL


   5/17/20 09:00


 8/15/20 08:59  5/18/20 10:02


 


 


 Diltiazem HCl


  (Cardizem CD)  180 mg  DAILY


 ORAL


   5/17/20 09:00


 6/16/20 08:59  5/19/20 09:22


 


 


 Docusate Sodium


  (Colace)  250 mg  TWICE A  DAY


 ORAL


   5/17/20 09:00


 6/16/20 08:59  5/18/20 17:12


 


 


 Hydralazine HCl


  (Apresoline)  50 mg  EVERY 8  HOURS


 ORAL


   5/19/20 14:00


 8/15/20 05:59   


 


 


 Isosorbide


 Dinitrate


  (Isordil)  10 mg  Q6HR


 ORAL


   5/17/20 06:00


 6/16/20 05:59  5/19/20 11:22


 


 


 Metoprolol


 Tartrate


  (Lopressor)  25 mg  EVERY 12  HOURS


 ORAL


   5/17/20 21:00


 8/15/20 20:59  5/18/20 21:44


 


 


 Mineral Oil


  (Fleet's Mineral


 Oil Enema)  133 ml  DAILYPRN  PRN


 RECTAL


 CONSTIPATION  5/17/20 05:58


 6/16/20 05:57   


 


 


 Multivitamins


 Therapeutic


  (Therapeutic


 Multivitamin)  1 ea  DAILY


 ORAL


   5/17/20 09:00


 6/16/20 08:59  5/19/20 09:22


 


 


 Sorbitol


  (sorbitoL)  30 ml  EVERY 6  HOURS


 ORAL


   5/17/20 12:00


 6/16/20 11:59  5/18/20 17:13


 











Assessment/Plan


Problems:  


(1) Right lower lobe pneumonia


(2) Suspected COVID-19 virus infection


(3) UTI (urinary tract infection)


(4) Moderate pulmonary arterial systolic hypertension


(5) Renal failure (ARF), acute on chronic


(6) Chronic atrial fibrillation


(7) History of CVA (cerebrovascular accident)


(8) HTN (hypertension)


Assessment/Plan


afebrile > 48 hours


COVID pending 


EF noted, 65%


vital signs stable


bun/creatinine improving


check cultures


iv abx


monitor BP and heart rate.











Doe Barcenas MD May 19, 2020 12:32

## 2020-05-19 NOTE — CARDIAC ELECTROPHYSIOLOGY PN
Assessment/Plan


Assessment/Plan


1. Non-ST elevation myocardial infarction by elevated troponin.


   Could be due to renal failure, creatinine is 2.  The EKG


showed atrial fibrillation, occasional PVC. Continue aspirin, Lipitor,

metoprolol and Imdur.   EF 65%





2. Atrial fibrillation.  On Cardizem  mg daily and Digoxin.  On Eliquis 5 

bid.


Dig level 1.1 today





3. Hypertension, stable on our Cardizem, Lopressor and increase Hydralazine to 

50 tid, Isordil .


4. COPD on albuterol.


5. Occasional PVCs.  Echocardiogram shows ejection fraction of 65%.  


6. Rule out COVID pneumonia.


7. Renal failure, creatinine of 2.








DW Dr Mello





Subjective


Subjective


Remained in atrial fib with controlled rate. At times slow. BP was in 200s and 

got Clonidine





Objective





Last 24 Hour Vital Signs








  Date Time  Temp Pulse Resp B/P (MAP) Pulse Ox O2 Delivery O2 Flow Rate FiO2


 


5/19/20 11:22    199/113    


 


5/19/20 11:22    199/113    


 


5/19/20 09:22  55  156/104    


 


5/19/20 09:00  55  156/104    


 


5/19/20 09:00  55      


 


5/19/20 09:00      Nasal Cannula 4.0 


 


5/19/20 08:00 97.6 55 20 156/104 (121) 98   


 


5/19/20 08:00  57      


 


5/19/20 06:00 97.5 57 20 184/92 (122) 95   


 


5/19/20 05:33    183/82    


 


5/19/20 05:33    183/82    


 


5/19/20 04:00  79      


 


5/19/20 04:00 97.5 57 20 184/92 (122) 95   


 


5/19/20 00:11    159/76    


 


5/19/20 00:00 98.5 55 20 172/83 (112) 97   


 


5/18/20 21:44  59  159/76    


 


5/18/20 21:44    159/76    


 


5/18/20 21:00      Nasal Cannula 4.0 


 


5/18/20 20:00  57      


 


5/18/20 20:00 97.5 59 20 159/76 (103) 96   


 


5/18/20 17:12    148/73    


 


5/18/20 16:00 98.2 54 18 148/73 (98) 95   


 


5/18/20 16:00  71      


 


5/18/20 14:31    158/75    


 


5/18/20 12:57    158/75    


 


5/18/20 12:00 97.8 62 20 158/75 (102) 93   


 


5/18/20 12:00  60      

















Intake and Output  


 


 5/18/20 5/19/20





 19:00 07:00


 


Intake Total 360 ml 


 


Output Total 600 ml 


 


Balance -240 ml 


 


  


 


Intake Oral 360 ml 


 


Output Urine Total 600 ml 


 


# Bowel Movements 1 











Laboratory Tests








Test


  5/19/20


04:30


 


White Blood Count


  3.9 K/UL


(4.8-10.8)  L


 


Red Blood Count


  4.82 M/UL


(4.70-6.10)


 


Hemoglobin


  15.3 G/DL


(14.2-18.0)


 


Hematocrit


  44.1 %


(42.0-52.0)


 


Mean Corpuscular Volume 91 FL (80-99)  


 


Mean Corpuscular Hemoglobin


  31.8 PG


(27.0-31.0)  H


 


Mean Corpuscular Hemoglobin


Concent 34.7 G/DL


(32.0-36.0)


 


Red Cell Distribution Width


  11.1 %


(11.6-14.8)  L


 


Platelet Count


  131 K/UL


(150-450)  L


 


Mean Platelet Volume


  7.5 FL


(6.5-10.1)


 


Neutrophils (%) (Auto)


  73.9 %


(45.0-75.0)


 


Lymphocytes (%) (Auto)


  15.4 %


(20.0-45.0)  L


 


Monocytes (%) (Auto)


  10.1 %


(1.0-10.0)  H


 


Eosinophils (%) (Auto)


  0.1 %


(0.0-3.0)


 


Basophils (%) (Auto)


  0.6 %


(0.0-2.0)


 


Sodium Level


  139 MMOL/L


(136-145)


 


Potassium Level


  4.4 MMOL/L


(3.5-5.1)


 


Chloride Level


  104 MMOL/L


()


 


Carbon Dioxide Level


  24 MMOL/L


(21-32)


 


Anion Gap


  11 mmol/L


(5-15)


 


Blood Urea Nitrogen


  39 mg/dL


(7-18)  H


 


Creatinine


  1.7 MG/DL


(0.55-1.30)  H


 


Estimat Glomerular Filtration


Rate 47.3 mL/min


(>60)


 


Glucose Level


  104 MG/DL


()


 


Calcium Level


  9.0 MG/DL


(8.5-10.1)


 


Digoxin Level


  1.1 NG/ML


(0.5-2.0)











Microbiology








 Date/Time


Source Procedure


Growth Status


 


 


 5/17/20 06:50


Nasal Nares MRSA Culture - Final


NO METHICILLIN RESISTANT STAPH AUREUS... Complete


 


 5/16/20 21:40


Urine,Clean Catch Urine Culture - Preliminary


Gram Negative Bacillus 1 Resulted


 


 5/17/20 06:50


Rectum  Received








Objective





HEAD AND NECK:  Showed no JVD.


LUNGS:  Coarse rhonchi.


CARDIOVASCULAR:  Regular S1 and S2 with no gallop.


ABDOMEN:  Soft.


EXTREMITIES:  No pitting edema.











Jake Lan MD May 19, 2020 11:59

## 2020-05-19 NOTE — NUR
*-* INSURANCE *-*



ALL AVAILABLE CLINICALS HAVE BEEN FAXED TO:



[employer] ([place]) 

[phone]   Work

[phone]   Fax

[phone]   FAX

[phone]   FAX

## 2020-05-19 NOTE — INFECTIOUS DISEASES PROG NOTE
Assessment/Plan


Assessment/Plan








Assessment:


Severe Sepsis


Pneumonia- high suspicion for COVID19


Acute hypoxic respiratory failure on 4l NC


   -5/16 CXR:  Right lower lung pneumonia. Cardiomegaly.





Probable UTI


  -u/a wbc 30-40, nit neg, leuk +1; ucx >100k GNR





Fever; SP


No leukocytosis> mild leukopenia


Thrombocytopenia





LIZA, improving





Elevated AST





Accelerated hypertension





NSTEMI





HTN


COPD


 CVA w/ residual R side weakness


 HLD


dysphagia


pneumonia


 DNR/DNI status


SNF resident ([facility], [place])





Plan:


-COntinue empiric CEfepime #3(abx d #4) and Azithromycin #4 for now


     -5/16 SP Ceftriaxone x1, IV Vancomycin x1





-f/u cx


-Monitor CBC/CMP, temperatures


-f/u sp cx, legionella ag urine


-COVID19 isolation and testing


-aspiration precautions


-Renal, pulm and cards f/u





Thank you for consulting [facility]. Will continue to follow along with 

you.





Discussed with RN,





Subjective


Allergies:  


Coded Allergies:  


     No Known Allergies (Unverified , 5/16/20)


Subjective


afebrile >48hrs


at 4l NC





Objective


Vital Signs





Last 24 Hour Vital Signs








  Date Time  Temp Pulse Resp B/P (MAP) Pulse Ox O2 Delivery O2 Flow Rate FiO2


 


5/19/20 12:00 97.9 69 20 199/113 (141) 95   


 


5/19/20 11:22    199/113    


 


5/19/20 11:22    199/113    


 


5/19/20 09:22  55  156/104    


 


5/19/20 09:00  55  156/104    


 


5/19/20 09:00  55      


 


5/19/20 09:00      Nasal Cannula 4.0 


 


5/19/20 08:00 97.6 55 20 156/104 (121) 98   


 


5/19/20 08:00  57      


 


5/19/20 06:00 97.5 57 20 184/92 (122) 95   


 


5/19/20 05:33    183/82    


 


5/19/20 05:33    183/82    


 


5/19/20 04:00  79      


 


5/19/20 04:00 97.5 57 20 184/92 (122) 95   


 


5/19/20 00:11    159/76    


 


5/19/20 00:00 98.5 55 20 172/83 (112) 97   


 


5/18/20 21:44  59  159/76    


 


5/18/20 21:44    159/76    


 


5/18/20 21:00      Nasal Cannula 4.0 


 


5/18/20 20:00  57      


 


5/18/20 20:00 97.5 59 20 159/76 (103) 96   


 


5/18/20 17:12    148/73    


 


5/18/20 16:00 98.2 54 18 148/73 (98) 95   


 


5/18/20 16:00  71      


 


5/18/20 14:31    158/75    


 


5/18/20 12:57    158/75    








Height (Feet):  5


Height (Inches):  9.00


Weight (Pounds):  160


Objective


not examined to Limit COVID19 exposure





Microbiology








 Date/Time


Source Procedure


Growth Status


 


 


 5/17/20 06:50


Nasal Nares MRSA Culture - Final


NO METHICILLIN RESISTANT STAPH AUREUS... Complete


 


 5/16/20 21:40


Urine,Clean Catch Urine Culture - Preliminary


Gram Negative Bacillus 1 Resulted


 


 5/17/20 06:50


Rectum  Received











Laboratory Tests








Test


  5/19/20


04:30


 


White Blood Count


  3.9 K/UL


(4.8-10.8)  L


 


Red Blood Count


  4.82 M/UL


(4.70-6.10)


 


Hemoglobin


  15.3 G/DL


(14.2-18.0)


 


Hematocrit


  44.1 %


(42.0-52.0)


 


Mean Corpuscular Volume 91 FL (80-99)  


 


Mean Corpuscular Hemoglobin


  31.8 PG


(27.0-31.0)  H


 


Mean Corpuscular Hemoglobin


Concent 34.7 G/DL


(32.0-36.0)


 


Red Cell Distribution Width


  11.1 %


(11.6-14.8)  L


 


Platelet Count


  131 K/UL


(150-450)  L


 


Mean Platelet Volume


  7.5 FL


(6.5-10.1)


 


Neutrophils (%) (Auto)


  73.9 %


(45.0-75.0)


 


Lymphocytes (%) (Auto)


  15.4 %


(20.0-45.0)  L


 


Monocytes (%) (Auto)


  10.1 %


(1.0-10.0)  H


 


Eosinophils (%) (Auto)


  0.1 %


(0.0-3.0)


 


Basophils (%) (Auto)


  0.6 %


(0.0-2.0)


 


Sodium Level


  139 MMOL/L


(136-145)


 


Potassium Level


  4.4 MMOL/L


(3.5-5.1)


 


Chloride Level


  104 MMOL/L


()


 


Carbon Dioxide Level


  24 MMOL/L


(21-32)


 


Anion Gap


  11 mmol/L


(5-15)


 


Blood Urea Nitrogen


  39 mg/dL


(7-18)  H


 


Creatinine


  1.7 MG/DL


(0.55-1.30)  H


 


Estimat Glomerular Filtration


Rate 47.3 mL/min


(>60)


 


Glucose Level


  104 MG/DL


()


 


Calcium Level


  9.0 MG/DL


(8.5-10.1)


 


Digoxin Level


  1.1 NG/ML


(0.5-2.0)











Current Medications








 Medications


  (Trade)  Dose


 Ordered  Sig/Michael


 Route


 PRN Reason  Start Time


 Stop Time Status Last Admin


Dose Admin


 


 Acetaminophen


  (Tylenol)  650 mg  Q6H  PRN


 ORAL


 mild pain/fever  5/17/20 08:48


 6/16/20 08:47   


 


 


 Albuterol Sulfate


  (Proventil MDI)  2 puff  Q4H  PRN


 INH


 Shortness of Breath  5/17/20 07:30


 8/15/20 07:29   


 


 


 Apixaban


  (Eliquis)  2.5 mg  BID


 ORAL


   5/18/20 18:00


 8/16/20 17:59  5/19/20 09:22


 


 


 Artificial Tears


  (Akwa-Tears)  1 drop  BID


 BOTH EYES


   5/17/20 09:00


 6/16/20 08:59  5/19/20 09:24


 


 


 Aspirin


  (Ecotrin)  81 mg  DAILY


 ORAL


   5/17/20 09:00


 7/1/20 08:59  5/19/20 09:22


 


 


 Atorvastatin


 Calcium


  (Lipitor)  20 mg  BEDTIME


 ORAL


   5/17/20 21:00


 8/15/20 20:59  5/18/20 21:43


 


 


 Azithromycin 500


 mg/Dextrose  275 ml @ 


 275 mls/hr  Q24H


 IV


   5/17/20 21:00


 5/23/20 21:59  5/18/20 21:43


 


 


 Bisacodyl


  (Dulcolax)  10 mg  PRN


 RECTAL


   5/17/20 00:15


 8/15/20 00:14   


 


 


 Cefepime HCl 1 gm/


 Dextrose  50 ml @ 


 100 mls/hr  EVERY 12  HOURS


 IVPB


   5/17/20 09:00


 5/24/20 08:59  5/19/20 09:24


 


 


 Clonidine HCl


  (Catapres Tab)  0.1 mg  Q4H  PRN


 ORAL


 sbp>160  5/17/20 08:48


 8/15/20 08:47  5/19/20 11:22


 


 


 Dextrose


  (Dextrose 50%)  25 ml  Q30M  PRN


 IV


 Hypoglycemia  5/17/20 00:15


 8/15/20 00:14   


 


 


 Dextrose


  (Dextrose 50%)  50 ml  Q30M  PRN


 IV


 Hypoglycemia  5/17/20 00:15


 8/15/20 00:14   


 


 


 Dextrose/


 Electrolytes  1,000 ml @ 


 75 mls/hr  [id number]


 IV


   5/17/20 01:00


 6/16/20 00:59  5/18/20 17:13


 


 


 Digoxin


  (Lanoxin)  0.125 mg  DAILY


 ORAL


   5/17/20 09:00


 8/15/20 08:59  5/18/20 10:02


 


 


 Diltiazem HCl


  (Cardizem CD)  180 mg  DAILY


 ORAL


   5/17/20 09:00


 6/16/20 08:59  5/19/20 09:22


 


 


 Docusate Sodium


  (Colace)  250 mg  TWICE A  DAY


 ORAL


   5/17/20 09:00


 6/16/20 08:59  5/18/20 17:12


 


 


 Hydralazine HCl


  (Apresoline)  50 mg  EVERY 8  HOURS


 ORAL


   5/19/20 14:00


 8/15/20 05:59   


 


 


 Isosorbide


 Dinitrate


  (Isordil)  10 mg  Q6HR


 ORAL


   5/17/20 06:00


 6/16/20 05:59  5/19/20 11:22


 


 


 Metoprolol


 Tartrate


  (Lopressor)  25 mg  EVERY 12  HOURS


 ORAL


   5/17/20 21:00


 8/15/20 20:59  5/18/20 21:44


 


 


 Mineral Oil


  (Fleet's Mineral


 Oil Enema)  133 ml  DAILYPRN  PRN


 RECTAL


 CONSTIPATION  5/17/20 05:58


 6/16/20 05:57   


 


 


 Multivitamins


 Therapeutic


  (Therapeutic


 Multivitamin)  1 ea  DAILY


 ORAL


   5/17/20 09:00


 6/16/20 08:59  5/19/20 09:22


 


 


 Sorbitol


  (sorbitoL)  30 ml  EVERY 6  HOURS


 ORAL


   5/17/20 12:00


 6/16/20 11:59  5/18/20 17:13


 

















Tatum Mills M.D. May 19, 2020 12:12

## 2020-05-19 NOTE — NUR
NURSE NOTES:

Received report from Shahnaz/RN, Patient is asleep, lying semi-grove's, resting comfortably. 
On 4L nasal canula, no acute distress/SOB noted. IV on right AC 20G patent, no bleeding or 
infiltration noted. Bed in low position and locked. Call light within reach. Encouraged to 
use call light when needed. Will continue plan of care.

## 2020-05-19 NOTE — NUR
HAND-OFF: 

Report given to LISA Gan. Patient shows no signs of distress or pain at the time. Endorsed 
plan of care.

## 2020-05-20 VITALS — DIASTOLIC BLOOD PRESSURE: 80 MMHG | SYSTOLIC BLOOD PRESSURE: 154 MMHG

## 2020-05-20 VITALS — SYSTOLIC BLOOD PRESSURE: 161 MMHG | DIASTOLIC BLOOD PRESSURE: 84 MMHG

## 2020-05-20 VITALS — SYSTOLIC BLOOD PRESSURE: 172 MMHG | DIASTOLIC BLOOD PRESSURE: 86 MMHG

## 2020-05-20 VITALS — DIASTOLIC BLOOD PRESSURE: 90 MMHG | SYSTOLIC BLOOD PRESSURE: 176 MMHG

## 2020-05-20 VITALS — SYSTOLIC BLOOD PRESSURE: 166 MMHG | DIASTOLIC BLOOD PRESSURE: 81 MMHG

## 2020-05-20 VITALS — SYSTOLIC BLOOD PRESSURE: 157 MMHG | DIASTOLIC BLOOD PRESSURE: 81 MMHG

## 2020-05-20 VITALS — DIASTOLIC BLOOD PRESSURE: 86 MMHG | SYSTOLIC BLOOD PRESSURE: 172 MMHG

## 2020-05-20 LAB
ADD MANUAL DIFF: NO
ALBUMIN SERPL-MCNC: 2.4 G/DL (ref 3.4–5)
ALBUMIN/GLOB SERPL: 0.5 {RATIO} (ref 1–2.7)
ALP SERPL-CCNC: 121 U/L (ref 46–116)
ALT SERPL-CCNC: 53 U/L (ref 12–78)
ANION GAP SERPL CALC-SCNC: 11 MMOL/L (ref 5–15)
AST SERPL-CCNC: 68 U/L (ref 15–37)
BASOPHILS NFR BLD AUTO: 0.8 % (ref 0–2)
BILIRUB DIRECT SERPL-MCNC: 0.8 MG/DL (ref 0–0.3)
BILIRUB SERPL-MCNC: 1.3 MG/DL (ref 0.2–1)
BUN SERPL-MCNC: 39 MG/DL (ref 7–18)
CALCIUM SERPL-MCNC: 8.7 MG/DL (ref 8.5–10.1)
CHLORIDE SERPL-SCNC: 106 MMOL/L (ref 98–107)
CO2 SERPL-SCNC: 22 MMOL/L (ref 21–32)
CREAT SERPL-MCNC: 1.6 MG/DL (ref 0.55–1.3)
EOSINOPHIL NFR BLD AUTO: 0.1 % (ref 0–3)
ERYTHROCYTE [DISTWIDTH] IN BLOOD BY AUTOMATED COUNT: 11 % (ref 11.6–14.8)
GLOBULIN SER-MCNC: 4.6 G/DL
HCT VFR BLD CALC: 42.9 % (ref 42–52)
HGB BLD-MCNC: 14.8 G/DL (ref 14.2–18)
LYMPHOCYTES NFR BLD AUTO: 12.2 % (ref 20–45)
MCV RBC AUTO: 91 FL (ref 80–99)
MONOCYTES NFR BLD AUTO: 12.8 % (ref 1–10)
NEUTROPHILS NFR BLD AUTO: 74.1 % (ref 45–75)
PHOSPHATE SERPL-MCNC: 2.2 MG/DL (ref 2.5–4.9)
PLATELET # BLD: 154 K/UL (ref 150–450)
POTASSIUM SERPL-SCNC: 3.1 MMOL/L (ref 3.5–5.1)
RBC # BLD AUTO: 4.74 M/UL (ref 4.7–6.1)
SODIUM SERPL-SCNC: 139 MMOL/L (ref 136–145)
WBC # BLD AUTO: 4.4 K/UL (ref 4.8–10.8)

## 2020-05-20 RX ADMIN — SORBITOL SOLUTION (BULK) SCH ML: 70 SOLUTION at 00:00

## 2020-05-20 RX ADMIN — APIXABAN SCH MG: 2.5 TABLET, FILM COATED ORAL at 09:27

## 2020-05-20 RX ADMIN — HYDRALAZINE HYDROCHLORIDE SCH MG: 50 TABLET ORAL at 05:54

## 2020-05-20 RX ADMIN — ASPIRIN SCH MG: 81 TABLET, DELAYED RELEASE ORAL at 09:26

## 2020-05-20 RX ADMIN — AZITHROMYCIN DIHYDRATE SCH MLS/HR: 500 INJECTION, POWDER, LYOPHILIZED, FOR SOLUTION INTRAVENOUS at 22:11

## 2020-05-20 RX ADMIN — SORBITOL SOLUTION (BULK) SCH ML: 70 SOLUTION at 17:09

## 2020-05-20 RX ADMIN — DIGOXIN SCH MG: 0.12 TABLET ORAL at 09:27

## 2020-05-20 RX ADMIN — HYDRALAZINE HYDROCHLORIDE SCH MG: 50 TABLET ORAL at 22:06

## 2020-05-20 RX ADMIN — Medication SCH EA: at 09:26

## 2020-05-20 RX ADMIN — DOCUSATE SODIUM SCH MG: 250 CAPSULE, LIQUID FILLED ORAL at 09:27

## 2020-05-20 RX ADMIN — TAMSULOSIN HYDROCHLORIDE SCH MG: 0.4 CAPSULE ORAL at 09:26

## 2020-05-20 RX ADMIN — TAMSULOSIN HYDROCHLORIDE SCH MG: 0.4 CAPSULE ORAL at 17:10

## 2020-05-20 RX ADMIN — SORBITOL SOLUTION (BULK) SCH ML: 70 SOLUTION at 05:54

## 2020-05-20 RX ADMIN — APIXABAN SCH MG: 2.5 TABLET, FILM COATED ORAL at 17:10

## 2020-05-20 RX ADMIN — DOCUSATE SODIUM SCH MG: 250 CAPSULE, LIQUID FILLED ORAL at 17:10

## 2020-05-20 RX ADMIN — SORBITOL SOLUTION (BULK) SCH ML: 70 SOLUTION at 23:56

## 2020-05-20 RX ADMIN — DILTIAZEM HYDROCHLORIDE SCH MG: 180 CAPSULE, EXTENDED RELEASE ORAL at 09:27

## 2020-05-20 RX ADMIN — SORBITOL SOLUTION (BULK) SCH ML: 70 SOLUTION at 12:20

## 2020-05-20 RX ADMIN — HYDRALAZINE HYDROCHLORIDE SCH MG: 50 TABLET ORAL at 14:19

## 2020-05-20 RX ADMIN — ATORVASTATIN CALCIUM SCH MG: 20 TABLET, FILM COATED ORAL at 22:07

## 2020-05-20 NOTE — NUR
CASE MANAGEMENT:REVIEW



5/20/20

SI: SEPSIS. RLL PNA

AC/CHR RENAL FAILURE. COVID 19 DETECTED

97.8   72  22  172/86  96% ON 4L 



IS: IV AZITHROMYCIN Q24

IV CEFEPIME Q12

HYDRALAZINE PO Q8HRS

FLOMAX PO BID

MINOXIDIL PO Q4HRS PRN

ELIQUIS PO BID 

LIPITOR PO QHS

LOPRESSOR PO Q12

ASA PO QD

CARDIZEM PO QD

DIGOXIN PO QD

**: TELEMETRY STATUS

DCP: PATIENT IS FROM [facility]:

CALLED [facility] T: [phone] AND SPOKE WITH ANA

INFORMED ANA PATIENT WAS POSITIVE FOR COVID 19 AND ASKED IF THEY WERE INTERESTED IN 
TRANSFERRING THEIR PATIENT. PER ANA, A [facility]  HAS NOT BEEN ASSIGNED YET FOR 
TODAY. 

THIS  PROVIDED CALL BACK NUMBER AND REQUESTED [facility]  CALL

## 2020-05-20 NOTE — PULMONOLOGY PROGRESS NOTE
Subjective


ROS Limited/Unobtainable:  Yes


Constitutional:  Reports: no symptoms


HEENT:  Repors: no symptoms


Allergies:  


Coded Allergies:  


     No Known Allergies (Unverified , 5/16/20)


All Systems:  reviewed and negative except above





Objective





Last 24 Hour Vital Signs








  Date Time  Temp Pulse Resp B/P (MAP) Pulse Ox O2 Delivery O2 Flow Rate FiO2


 


5/20/20 12:00 97.7 63 22 161/84 (109) 96   


 


5/20/20 09:27  80  154/80    


 


5/20/20 09:27  80      


 


5/20/20 09:27  80  154/80    


 


5/20/20 09:00      Nasal Cannula 4.0 


 


5/20/20 08:00 97.5 80 22 154/80 (104) 96   


 


5/20/20 07:29  66      


 


5/20/20 06:00 97.8 72 22 172/86 (114) 96   


 


5/20/20 05:54    172/86    


 


5/20/20 05:54    172/86    


 


5/20/20 04:00 97.8 72 22 172/86 (114) 96   


 


5/20/20 04:00  65      


 


5/20/20 00:23    176/90    


 


5/20/20 00:00 97.5 77 21 176/90 (118) 95   


 


5/20/20 00:00  58      


 


5/19/20 21:57    177/90    


 


5/19/20 21:00  59  177/90    


 


5/19/20 21:00      Nasal Cannula 4.0 


 


5/19/20 20:00 97.9 61 23 177/90 (119) 96   


 


5/19/20 20:00  52      


 


5/19/20 17:52    159/87    


 


5/19/20 16:00  59      


 


5/19/20 16:00 96.7 102 20 159/87 (111) 96   


 


5/19/20 13:09    199/113    

















Intake and Output  


 


 5/19/20 5/20/20





 19:00 07:00


 


Intake Total 120 ml 


 


Output Total 200 ml 400 ml


 


Balance -80 ml -400 ml


 


  


 


Intake Oral 120 ml 


 


Output Urine Total 200 ml 400 ml


 


# Voids  1


 


# Bowel Movements  1








General Appearance:  WD/WN


HEENT:  normocephalic


Respiratory:  rhonchi - left, rhonchi - right


Cardiovascular:  normal peripheral pulses, normal rate, regular rhythm


Abdomen:  normal bowel sounds, soft, non tender


Genitourinary:  normal external genitalia


Extremities:  no cyanosis


Skin:  no rash


Neurologic:  CNs II-XII grossly normal, no motor/sensory deficits





Microbiology








 Date/Time


Source Procedure


Growth Status


 


 


 5/17/20 21:00


Rectum VRE Culture - Final


NO VANCOMYCIN RESISTANT ENTEROCOCCUS ... Complete








Laboratory Tests


5/20/20 06:42: 


White Blood Count 4.4L, Red Blood Count 4.74, Hemoglobin 14.8, Hematocrit 42.9, 

Mean Corpuscular Volume 91, Mean Corpuscular Hemoglobin 31.3H, Mean Corpuscular 

Hemoglobin Concent 34.5, Red Cell Distribution Width 11.0L, Platelet Count 154, 

Mean Platelet Volume 6.2L, Neutrophils (%) (Auto) 74.1, Lymphocytes (%) (Auto) 

12.2L, Monocytes (%) (Auto) 12.8H, Eosinophils (%) (Auto) 0.1, Basophils (%) (

Auto) 0.8, Sodium Level 139, Potassium Level 3.1L, Chloride Level 106, Carbon 

Dioxide Level 22, Anion Gap 11, Blood Urea Nitrogen 39H, Creatinine 1.6H, 

Estimat Glomerular Filtration Rate 50.7, Glucose Level 123H, Uric Acid 5.9, 

Calcium Level 8.7, Phosphorus Level 2.2L, Magnesium Level 2.3, Total Bilirubin 

1.3H, Direct Bilirubin 0.8H, Aspartate Amino Transf (AST/SGOT) 68H, Alanine 

Aminotransferase (ALT/SGPT) 53, Alkaline Phosphatase 121H, Troponin I 0.147H, C-

Reactive Protein, Quantitative 12.2H, Pro-B-Type Natriuretic Peptide 9141H, 

Total Protein 7.0, Albumin 2.4L, Globulin 4.6, Albumin/Globulin Ratio 0.5L, 

Digoxin Level 0.7





Current Medications








 Medications


  (Trade)  Dose


 Ordered  Sig/Michael


 Route


 PRN Reason  Start Time


 Stop Time Status Last Admin


Dose Admin


 


 Acetaminophen


  (Tylenol)  650 mg  Q6H  PRN


 ORAL


 mild pain/fever  5/17/20 08:48


 6/16/20 08:47   


 


 


 Albuterol Sulfate


  (Proventil MDI)  2 puff  Q4H  PRN


 INH


 Shortness of Breath  5/17/20 07:30


 8/15/20 07:29   


 


 


 Apixaban


  (Eliquis)  2.5 mg  BID


 ORAL


   5/18/20 18:00


 8/16/20 17:59  5/20/20 09:27


 


 


 Artificial Tears


  (Akwa-Tears)  1 drop  BID


 BOTH EYES


   5/17/20 09:00


 6/16/20 08:59  5/20/20 09:28


 


 


 Aspirin


  (Ecotrin)  81 mg  DAILY


 ORAL


   5/17/20 09:00


 7/1/20 08:59  5/20/20 09:26


 


 


 Atorvastatin


 Calcium


  (Lipitor)  20 mg  BEDTIME


 ORAL


   5/17/20 21:00


 8/15/20 20:59  5/19/20 21:55


 


 


 Azithromycin 500


 mg/Dextrose  275 ml @ 


 275 mls/hr  Q24H


 IV


   5/17/20 21:00


 5/23/20 21:59  5/19/20 21:55


 


 


 Bisacodyl


  (Dulcolax)  10 mg  PRN


 RECTAL


   5/17/20 00:15


 8/15/20 00:14   


 


 


 Cefepime HCl 1 gm/


 Dextrose  50 ml @ 


 100 mls/hr  EVERY 12  HOURS


 IVPB


   5/17/20 09:00


 5/24/20 08:59  5/20/20 09:28


 


 


 Dextrose


  (Dextrose 50%)  25 ml  Q30M  PRN


 IV


 Hypoglycemia  5/17/20 00:15


 8/15/20 00:14   


 


 


 Dextrose


  (Dextrose 50%)  50 ml  Q30M  PRN


 IV


 Hypoglycemia  5/17/20 00:15


 8/15/20 00:14   


 


 


 Digoxin


  (Lanoxin)  0.125 mg  DAILY


 ORAL


   5/17/20 09:00


 8/15/20 08:59  5/20/20 09:27


 


 


 Diltiazem HCl


  (Cardizem CD)  180 mg  DAILY


 ORAL


   5/17/20 09:00


 6/16/20 08:59  5/20/20 09:27


 


 


 Docusate Sodium


  (Colace)  250 mg  TWICE A  DAY


 ORAL


   5/17/20 09:00


 6/16/20 08:59  5/20/20 09:27


 


 


 Hydralazine HCl


  (Apresoline)  100 mg  EVERY 8  HOURS


 ORAL


   5/19/20 22:00


 8/15/20 05:59  5/20/20 05:54


 


 


 Isosorbide


 Dinitrate


  (Isordil)  10 mg  Q6HR


 ORAL


   5/17/20 06:00


 6/16/20 05:59  5/20/20 05:54


 


 


 Metoprolol


 Tartrate


  (Lopressor)  25 mg  EVERY 12  HOURS


 ORAL


   5/17/20 21:00


 8/15/20 20:59  5/20/20 09:27


 


 


 Mineral Oil


  (Fleet's Mineral


 Oil Enema)  133 ml  DAILYPRN  PRN


 RECTAL


 CONSTIPATION  5/17/20 05:58


 6/16/20 05:57   


 


 


 Minoxidil


  (Loniten)  2.5 mg  Q4H  PRN


 ORAL


 BP over 170 syst and 100 diast  5/19/20 14:10


 8/17/20 14:09   


 


 


 Multivitamins


 Therapeutic


  (Therapeutic


 Multivitamin)  1 ea  DAILY


 ORAL


   5/17/20 09:00


 6/16/20 08:59  5/20/20 09:26


 


 


 Potassium Chloride


  (K-Dur)  40 meq  TWICE A  DAY


 ORAL


   5/20/20 09:00


 8/18/20 08:59  5/20/20 09:27


 


 


 Sorbitol


  (sorbitoL)  30 ml  EVERY 6  HOURS


 ORAL


   5/17/20 12:00


 6/16/20 11:59  5/18/20 17:13


 


 


 Tamsulosin HCl


  (Flomax)  0.4 mg  BID


 ORAL


   5/19/20 18:00


 6/18/20 17:59  5/20/20 09:26


 











Assessment/Plan


Problems:  


(1) Right lower lobe pneumonia


(2) Suspected COVID-19 virus infection


(3) UTI (urinary tract infection)


(4) Moderate pulmonary arterial systolic hypertension


(5) Renal failure (ARF), acute on chronic


(6) Chronic atrial fibrillation


(7) History of CVA (cerebrovascular accident)


(8) HTN (hypertension)


Assessment/Plan


stable


all reviewed


afebrile > 48 hours


COVID pending 


EF noted, 65%


vital signs stable


bun/creatinine improving


check cultures, Urine Citrobacter


iv abx


monitor BP and heart rate.











Doe Barcenas MD May 20, 2020 12:23

## 2020-05-20 NOTE — INTERNAL MED PROGRESS NOTE
Subjective


Date of Service:  May 20, 2020


Physician Name


Derek Ortez


Attending Physician


Urban Brown MD





Current Medications








 Medications


  (Trade)  Dose


 Ordered  Sig/Michael


 Route


 PRN Reason  Start Time


 Stop Time Status Last Admin


Dose Admin


 


 Acetaminophen


  (Tylenol)  650 mg  Q6H  PRN


 ORAL


 mild pain/fever  5/17/20 08:48


 6/16/20 08:47   


 


 


 Albuterol Sulfate


  (Proventil MDI)  2 puff  Q4H  PRN


 INH


 Shortness of Breath  5/17/20 07:30


 8/15/20 07:29   


 


 


 Apixaban


  (Eliquis)  2.5 mg  BID


 ORAL


   5/18/20 18:00


 8/16/20 17:59  5/20/20 09:27


 


 


 Artificial Tears


  (Akwa-Tears)  1 drop  BID


 BOTH EYES


   5/17/20 09:00


 6/16/20 08:59  5/20/20 09:28


 


 


 Aspirin


  (Ecotrin)  81 mg  DAILY


 ORAL


   5/17/20 09:00


 7/1/20 08:59  5/20/20 09:26


 


 


 Atorvastatin


 Calcium


  (Lipitor)  20 mg  BEDTIME


 ORAL


   5/17/20 21:00


 8/15/20 20:59  5/19/20 21:55


 


 


 Azithromycin 500


 mg/Dextrose  275 ml @ 


 275 mls/hr  Q24H


 IV


   5/17/20 21:00


 5/23/20 21:59  5/19/20 21:55


 


 


 Bisacodyl


  (Dulcolax)  10 mg  PRN


 RECTAL


   5/17/20 00:15


 8/15/20 00:14   


 


 


 Cefepime HCl 1 gm/


 Dextrose  50 ml @ 


 100 mls/hr  EVERY 12  HOURS


 IVPB


   5/17/20 09:00


 5/24/20 08:59  5/20/20 09:28


 


 


 Dextrose


  (Dextrose 50%)  25 ml  Q30M  PRN


 IV


 Hypoglycemia  5/17/20 00:15


 8/15/20 00:14   


 


 


 Dextrose


  (Dextrose 50%)  50 ml  Q30M  PRN


 IV


 Hypoglycemia  5/17/20 00:15


 8/15/20 00:14   


 


 


 Digoxin


  (Lanoxin)  0.125 mg  DAILY


 ORAL


   5/17/20 09:00


 8/15/20 08:59  5/20/20 09:27


 


 


 Diltiazem HCl


  (Cardizem CD)  180 mg  DAILY


 ORAL


   5/17/20 09:00


 6/16/20 08:59  5/20/20 09:27


 


 


 Docusate Sodium


  (Colace)  250 mg  TWICE A  DAY


 ORAL


   5/17/20 09:00


 6/16/20 08:59  5/20/20 09:27


 


 


 Hydralazine HCl


  (Apresoline)  100 mg  EVERY 8  HOURS


 ORAL


   5/19/20 22:00


 8/15/20 05:59  5/20/20 05:54


 


 


 Isosorbide


 Dinitrate


  (Isordil)  10 mg  Q6HR


 ORAL


   5/17/20 06:00


 6/16/20 05:59  5/20/20 05:54


 


 


 Metoprolol


 Tartrate


  (Lopressor)  25 mg  EVERY 12  HOURS


 ORAL


   5/17/20 21:00


 8/15/20 20:59  5/20/20 09:27


 


 


 Mineral Oil


  (Fleet's Mineral


 Oil Enema)  133 ml  DAILYPRN  PRN


 RECTAL


 CONSTIPATION  5/17/20 05:58


 6/16/20 05:57   


 


 


 Minoxidil


  (Loniten)  2.5 mg  Q4H  PRN


 ORAL


 BP over 170 syst and 100 diast  5/19/20 14:10


 8/17/20 14:09   


 


 


 Multivitamins


 Therapeutic


  (Therapeutic


 Multivitamin)  1 ea  DAILY


 ORAL


   5/17/20 09:00


 6/16/20 08:59  5/20/20 09:26


 


 


 Potassium Chloride


  (K-Dur)  40 meq  TWICE A  DAY


 ORAL


   5/20/20 09:00


 8/18/20 08:59  5/20/20 09:27


 


 


 Sorbitol


  (sorbitoL)  30 ml  EVERY 6  HOURS


 ORAL


   5/17/20 12:00


 6/16/20 11:59  5/18/20 17:13


 


 


 Tamsulosin HCl


  (Flomax)  0.4 mg  BID


 ORAL


   5/19/20 18:00


 6/18/20 17:59  5/20/20 09:26


 








Allergies:  


Coded Allergies:  


     No Known Allergies (Unverified , 5/16/20)


ROS Limited/Unobtainable:  Yes


Subjective


81 YO M admitted with shortness of breath.  Now respiratory failure.  Cover for 

Int Kiko-DR Brown





Objective





Last Vital Signs








  Date Time  Temp Pulse Resp B/P (MAP) Pulse Ox O2 Delivery O2 Flow Rate FiO2


 


5/20/20 09:27  80  154/80    


 


5/20/20 08:00 97.5  22  96   


 


5/19/20 21:00      Nasal Cannula 4.0 











Laboratory Tests








Test


  5/20/20


06:42


 


White Blood Count


  4.4 K/UL


(4.8-10.8)  L


 


Red Blood Count


  4.74 M/UL


(4.70-6.10)


 


Hemoglobin


  14.8 G/DL


(14.2-18.0)


 


Hematocrit


  42.9 %


(42.0-52.0)


 


Mean Corpuscular Volume 91 FL (80-99)  


 


Mean Corpuscular Hemoglobin


  31.3 PG


(27.0-31.0)  H


 


Mean Corpuscular Hemoglobin


Concent 34.5 G/DL


(32.0-36.0)


 


Red Cell Distribution Width


  11.0 %


(11.6-14.8)  L


 


Platelet Count


  154 K/UL


(150-450)


 


Mean Platelet Volume


  6.2 FL


(6.5-10.1)  L


 


Neutrophils (%) (Auto)


  74.1 %


(45.0-75.0)


 


Lymphocytes (%) (Auto)


  12.2 %


(20.0-45.0)  L


 


Monocytes (%) (Auto)


  12.8 %


(1.0-10.0)  H


 


Eosinophils (%) (Auto)


  0.1 %


(0.0-3.0)


 


Basophils (%) (Auto)


  0.8 %


(0.0-2.0)


 


Sodium Level


  139 MMOL/L


(136-145)


 


Potassium Level


  3.1 MMOL/L


(3.5-5.1)  L


 


Chloride Level


  106 MMOL/L


()


 


Carbon Dioxide Level


  22 MMOL/L


(21-32)


 


Anion Gap


  11 mmol/L


(5-15)


 


Blood Urea Nitrogen


  39 mg/dL


(7-18)  H


 


Creatinine


  1.6 MG/DL


(0.55-1.30)  H


 


Estimat Glomerular Filtration


Rate 50.7 mL/min


(>60)


 


Glucose Level


  123 MG/DL


()  H


 


Uric Acid


  5.9 MG/DL


(2.6-7.2)


 


Calcium Level


  8.7 MG/DL


(8.5-10.1)


 


Phosphorus Level


  2.2 MG/DL


(2.5-4.9)  L


 


Magnesium Level


  2.3 MG/DL


(1.8-2.4)


 


Total Bilirubin


  1.3 MG/DL


(0.2-1.0)  H


 


Direct Bilirubin


  0.8 MG/DL


(0.0-0.3)  H


 


Aspartate Amino Transf


(AST/SGOT) 68 U/L (15-37)


H


 


Alanine Aminotransferase


(ALT/SGPT) 53 U/L (12-78)


 


 


Alkaline Phosphatase


  121 U/L


()  H


 


Troponin I


  0.147 ng/mL


(0.000-0.056)


 


C-Reactive Protein,


Quantitative 12.2 mg/dL


(0.00-0.90)  H


 


Pro-B-Type Natriuretic Peptide


  9141 pg/mL


(0-125)  H


 


Total Protein


  7.0 G/DL


(6.4-8.2)


 


Albumin


  2.4 G/DL


(3.4-5.0)  L


 


Globulin 4.6 g/dL  


 


Albumin/Globulin Ratio


  0.5 (1.0-2.7)


L


 


Digoxin Level


  0.7 NG/ML


(0.5-2.0)











Microbiology








 Date/Time


Source Procedure


Growth Status


 


 


 5/17/20 21:00


Rectum VRE Culture - Final


NO VANCOMYCIN RESISTANT ENTEROCOCCUS ... Complete

















Intake and Output  


 


 5/19/20 5/20/20





 19:00 07:00


 


Intake Total 120 ml 


 


Output Total 200 ml 400 ml


 


Balance -80 ml -400 ml


 


  


 


Intake Oral 120 ml 


 


Output Urine Total 200 ml 400 ml


 


# Voids  1


 


# Bowel Movements  1








Objective


PHYSICAL EXAMINATION:


GENERAL:  The patient awake, responsive, no acute distress.


HEENT:  Pupils are equal and reactive to light.  Extraocular movements


intact.  Neck was supple.  No JVD.


LUNGS:  Good air entry.  No wheezing or rales. Decreased in bases.


HEART:  S1 and S2.  Distant heart sounds.  No murmur or gallops.


ABDOMEN:  Soft, nondistended, nontender. Positive bowel sounds.


EXTREMITIES:  No cyanosis, clubbing, edema.


NEUROLOGIC:  Cranial nerves II through XII grossly normal.  The patient


moving left side upper extremities and lower extremity.  Right upper


extremity contracture.  Right lower extremity is decreased motor.





Assessment/Plan


Assessment/Plan


ASSESSMENT:


1. Right lower lobe pneumonia.


2. Acute hypoxemic respiratory failure.


3. Await COVID-19 test result


4. Acute kidney injury on chronic renal insufficiency.


5. Sepsis.


6. Hypertension.


7. Dyslipidemia.


8. History of CVA with right hemiparesis.


9.  Urinary tract infection=Citrobacter Koseri





Plan:


1. Admit to telemetry on the COVID-19 isolation.  We will follow up with


COVID-19  cultures and follow up with urine culture and blood culture.


Monitor laboratory, IV hydration, and start the patient on broad-spectrum


antibiotic with azithromycin and cefepime.


2. DVT prophylaxis on Lovenox.


3. Code status is DNR.


4. We will follow up with Dr. Lan from Cardiology Electrophysiology,


Dr. Barcenas, Pulmonary Critical Care, Dr. Mello from Nephrology, and


Dr. Gentile from Infectious Diseases consultations.


5.  Non ST elevated myocardial infarction/elevated troponin


6.  ABX=azithromycin and cefepime











Derek Ortez MD May 20, 2020 10:44

## 2020-05-20 NOTE — NUR
*****TRANSFER UPDATE

THIS IS A [facility] PATIENT THAT BELONGS AT [facility]



CALLED [facility] T: [phone] AND SPOKE WITH ANA

INFORMED [facility] PATIENT WAS POSITIVE FOR COVID 19 AND ASKED IF THEY WERE INTERESTED IN 
TRANSFERRING THEIR PATIENT. PER ANA, A [facility]  HAS NOT BEEN ASSIGNED YET FOR 
TODAY. 

THIS  PROVIDED CALL BACK NUMBER AND REQUESTED [facility]  CALL



**WILL NEED A "STABLE FOR TRANSFER" ORDER IF [facility] WANTS THEIR PATIENT TRANSFERRED***

## 2020-05-20 NOTE — INFECTIOUS DISEASES PROG NOTE
Assessment/Plan


Assessment/Plan








Assessment:


Severe Sepsis 


Pneumonia- 2ry to COVID19


Acute hypoxic respiratory failure on 4l NC


   -5/16 CXR:  Right lower lung pneumonia. Cardiomegaly.


   -SARS COV2 PCR +


   


Probable UTI


  -u/a wbc 30-40, nit neg, leuk +1; ucx >100k C. koseri (pan S)


  Bcx NTD





Fever; SP


No leukocytosis> mild leukopenia


Thrombocytopenia





LIZA, improving





Elevated AST; improving





Accelerated hypertension





NSTEMI





HTN


COPD


 CVA w/ residual R side weakness


 HLD


dysphagia


pneumonia


 DNR/DNI status


SNF resident ([facility])





Plan:


-COntinue CEfepime #4(abx d #5/7) for UTI


-Azithromycin #5/5 


     -5/16 SP Ceftriaxone x1, IV Vancomycin x1





-f/u cx


-Monitor CBC/CMP, temperatures


-f/u sp cx, legionella ag urine


-COVID19 isolation 


-aspiration precautions


-Renal, pulm and cards f/u





Thank you for consulting [facility]. Will continue to follow along with 

you.





Discussed with RN,





Subjective


Allergies:  


Coded Allergies:  


     No Known Allergies (Unverified , 5/16/20)


Subjective


afebrile >72hrs


at 4l NC


Bcx NTD





Objective


Vital Signs





Last 24 Hour Vital Signs








  Date Time  Temp Pulse Resp B/P (MAP) Pulse Ox O2 Delivery O2 Flow Rate FiO2


 


5/20/20 12:20    161/84    


 


5/20/20 12:00 97.7 63 22 161/84 (109) 96   


 


5/20/20 11:32  59      


 


5/20/20 09:27  80  154/80    


 


5/20/20 09:27  80      


 


5/20/20 09:27  80  154/80    


 


5/20/20 09:00      Nasal Cannula 4.0 


 


5/20/20 08:00 97.5 80 22 154/80 (104) 96   


 


5/20/20 07:29  66      


 


5/20/20 06:00 97.8 72 22 172/86 (114) 96   


 


5/20/20 05:54    172/86    


 


5/20/20 05:54    172/86    


 


5/20/20 04:00 97.8 72 22 172/86 (114) 96   


 


5/20/20 04:00  65      


 


5/20/20 00:23    176/90    


 


5/20/20 00:00 97.5 77 21 176/90 (118) 95   


 


5/20/20 00:00  58      


 


5/19/20 21:57    177/90    


 


5/19/20 21:00  59  177/90    


 


5/19/20 21:00      Nasal Cannula 4.0 


 


5/19/20 20:00 97.9 61 23 177/90 (119) 96   


 


5/19/20 20:00  52      


 


5/19/20 17:52    159/87    


 


5/19/20 16:00  59      


 


5/19/20 16:00 96.7 102 20 159/87 (111) 96   


 


5/19/20 13:09    199/113    








Height (Feet):  5


Height (Inches):  9.00


Weight (Pounds):  216


Objective


Gen: no respiratory distress


Head: normocephalic


Lungs: no tachypnea or use of accessory resp muscles


Abd: not distended


HEENT: NC in place





Microbiology








 Date/Time


Source Procedure


Growth Status


 


 


 5/17/20 21:00


Rectum VRE Culture - Final


NO VANCOMYCIN RESISTANT ENTEROCOCCUS ... Complete











Laboratory Tests








Test


  5/20/20


06:42


 


White Blood Count


  4.4 K/UL


(4.8-10.8)  L


 


Red Blood Count


  4.74 M/UL


(4.70-6.10)


 


Hemoglobin


  14.8 G/DL


(14.2-18.0)


 


Hematocrit


  42.9 %


(42.0-52.0)


 


Mean Corpuscular Volume 91 FL (80-99)  


 


Mean Corpuscular Hemoglobin


  31.3 PG


(27.0-31.0)  H


 


Mean Corpuscular Hemoglobin


Concent 34.5 G/DL


(32.0-36.0)


 


Red Cell Distribution Width


  11.0 %


(11.6-14.8)  L


 


Platelet Count


  154 K/UL


(150-450)


 


Mean Platelet Volume


  6.2 FL


(6.5-10.1)  L


 


Neutrophils (%) (Auto)


  74.1 %


(45.0-75.0)


 


Lymphocytes (%) (Auto)


  12.2 %


(20.0-45.0)  L


 


Monocytes (%) (Auto)


  12.8 %


(1.0-10.0)  H


 


Eosinophils (%) (Auto)


  0.1 %


(0.0-3.0)


 


Basophils (%) (Auto)


  0.8 %


(0.0-2.0)


 


Sodium Level


  139 MMOL/L


(136-145)


 


Potassium Level


  3.1 MMOL/L


(3.5-5.1)  L


 


Chloride Level


  106 MMOL/L


()


 


Carbon Dioxide Level


  22 MMOL/L


(21-32)


 


Anion Gap


  11 mmol/L


(5-15)


 


Blood Urea Nitrogen


  39 mg/dL


(7-18)  H


 


Creatinine


  1.6 MG/DL


(0.55-1.30)  H


 


Estimat Glomerular Filtration


Rate 50.7 mL/min


(>60)


 


Glucose Level


  123 MG/DL


()  H


 


Uric Acid


  5.9 MG/DL


(2.6-7.2)


 


Calcium Level


  8.7 MG/DL


(8.5-10.1)


 


Phosphorus Level


  2.2 MG/DL


(2.5-4.9)  L


 


Magnesium Level


  2.3 MG/DL


(1.8-2.4)


 


Total Bilirubin


  1.3 MG/DL


(0.2-1.0)  H


 


Direct Bilirubin


  0.8 MG/DL


(0.0-0.3)  H


 


Aspartate Amino Transf


(AST/SGOT) 68 U/L (15-37)


H


 


Alanine Aminotransferase


(ALT/SGPT) 53 U/L (12-78)


 


 


Alkaline Phosphatase


  121 U/L


()  H


 


Troponin I


  0.147 ng/mL


(0.000-0.056)


 


C-Reactive Protein,


Quantitative 12.2 mg/dL


(0.00-0.90)  H


 


Pro-B-Type Natriuretic Peptide


  9141 pg/mL


(0-125)  H


 


Total Protein


  7.0 G/DL


(6.4-8.2)


 


Albumin


  2.4 G/DL


(3.4-5.0)  L


 


Globulin 4.6 g/dL  


 


Albumin/Globulin Ratio


  0.5 (1.0-2.7)


L


 


Digoxin Level


  0.7 NG/ML


(0.5-2.0)











Current Medications








 Medications


  (Trade)  Dose


 Ordered  Sig/Michael


 Route


 PRN Reason  Start Time


 Stop Time Status Last Admin


Dose Admin


 


 Acetaminophen


  (Tylenol)  650 mg  Q6H  PRN


 ORAL


 mild pain/fever  5/17/20 08:48


 6/16/20 08:47   


 


 


 Albuterol Sulfate


  (Proventil MDI)  2 puff  Q4H  PRN


 INH


 Shortness of Breath  5/17/20 07:30


 8/15/20 07:29   


 


 


 Apixaban


  (Eliquis)  2.5 mg  BID


 ORAL


   5/18/20 18:00


 8/16/20 17:59  5/20/20 09:27


 


 


 Artificial Tears


  (Akwa-Tears)  1 drop  BID


 BOTH EYES


   5/17/20 09:00


 6/16/20 08:59  5/20/20 09:28


 


 


 Aspirin


  (Ecotrin)  81 mg  DAILY


 ORAL


   5/17/20 09:00


 7/1/20 08:59  5/20/20 09:26


 


 


 Atorvastatin


 Calcium


  (Lipitor)  20 mg  BEDTIME


 ORAL


   5/17/20 21:00


 8/15/20 20:59  5/19/20 21:55


 


 


 Azithromycin 500


 mg/Dextrose  275 ml @ 


 275 mls/hr  Q24H


 IV


   5/17/20 21:00


 5/23/20 21:59  5/19/20 21:55


 


 


 Bisacodyl


  (Dulcolax)  10 mg  PRN


 RECTAL


   5/17/20 00:15


 8/15/20 00:14   


 


 


 Cefepime HCl 1 gm/


 Dextrose  50 ml @ 


 100 mls/hr  EVERY 12  HOURS


 IVPB


   5/17/20 09:00


 5/24/20 08:59  5/20/20 09:28


 


 


 Dextrose


  (Dextrose 50%)  25 ml  Q30M  PRN


 IV


 Hypoglycemia  5/17/20 00:15


 8/15/20 00:14   


 


 


 Dextrose


  (Dextrose 50%)  50 ml  Q30M  PRN


 IV


 Hypoglycemia  5/17/20 00:15


 8/15/20 00:14   


 


 


 Digoxin


  (Lanoxin)  0.125 mg  DAILY


 ORAL


   5/17/20 09:00


 8/15/20 08:59  5/20/20 09:27


 


 


 Diltiazem HCl


  (Cardizem CD)  180 mg  DAILY


 ORAL


   5/17/20 09:00


 6/16/20 08:59  5/20/20 09:27


 


 


 Docusate Sodium


  (Colace)  250 mg  TWICE A  DAY


 ORAL


   5/17/20 09:00


 6/16/20 08:59  5/20/20 09:27


 


 


 Hydralazine HCl


  (Apresoline)  100 mg  EVERY 8  HOURS


 ORAL


   5/19/20 22:00


 8/15/20 05:59  5/20/20 05:54


 


 


 Isosorbide


 Dinitrate


  (Isordil)  10 mg  Q6HR


 ORAL


   5/17/20 06:00


 6/16/20 05:59  5/20/20 12:20


 


 


 Metoprolol


 Tartrate


  (Lopressor)  25 mg  EVERY 12  HOURS


 ORAL


   5/17/20 21:00


 8/15/20 20:59  5/20/20 09:27


 


 


 Mineral Oil


  (Fleet's Mineral


 Oil Enema)  133 ml  DAILYPRN  PRN


 RECTAL


 CONSTIPATION  5/17/20 05:58


 6/16/20 05:57   


 


 


 Minoxidil


  (Loniten)  2.5 mg  Q4H  PRN


 ORAL


 BP over 170 syst and 100 diast  5/19/20 14:10


 8/17/20 14:09   


 


 


 Multivitamins


 Therapeutic


  (Therapeutic


 Multivitamin)  1 ea  DAILY


 ORAL


   5/17/20 09:00


 6/16/20 08:59  5/20/20 09:26


 


 


 Phosphorus


  (Phospha 250


 Neutral)  500 mg  ONCE


 ORAL


   5/20/20 12:30


 5/20/20 14:00   


 


 


 Potassium Chloride


  (K-Dur)  40 meq  TWICE A  DAY


 ORAL


   5/20/20 09:00


 8/18/20 08:59  5/20/20 09:27


 


 


 Sorbitol


  (sorbitoL)  30 ml  EVERY 6  HOURS


 ORAL


   5/17/20 12:00


 6/16/20 11:59  5/20/20 12:20


 


 


 Tamsulosin HCl


  (Flomax)  0.4 mg  BID


 ORAL


   5/19/20 18:00


 6/18/20 17:59  5/20/20 09:26


 

















Tatum Mills M.D. May 20, 2020 12:53

## 2020-05-20 NOTE — NEPHROLOGY PROGRESS NOTE
Assessment/Plan


Problem List:  


(1) Renal failure (ARF), acute on chronic


(2) NSTEMI (non-ST elevated myocardial infarction)


(3) Right lower lobe pneumonia


(4) Suspected COVID-19 virus infection


(5) HTN (hypertension)


(6) UTI (urinary tract infection)


Assessment





Acute renal failure most likely superimposed on underlying chronic kidney 

disease


Acute hypoxic respiratory failure, pneumonia, suspected COVID-19


Sepsis


Elevated troponin


Suspected congestive heart failure


Hypertension, hypertension urgency on admission


Evidence of UTI


Plan





Suggestions:


Blood pressure out of control , will continue to adjust


Watch slow heart rate.


Change PRN blood pressure medication to minoxidil


Increase hydralazine dose


Monitor renal parameters


Continue rest





Previously:


Slow hydration


Watch for CHF symptoms


Pulmonary toilet


Antibiotics


Urine studies


Keep the blood pressure and blood sugar in check


Monitor renal parameters


Per orders


Per consultants





Subjective


ROS Limited/Unobtainable:  No


Constitutional:  Reports: malaise, weakness





Objective


Objective





Last 24 Hour Vital Signs








  Date Time  Temp Pulse Resp B/P (MAP) Pulse Ox O2 Delivery O2 Flow Rate FiO2


 


5/20/20 12:20    161/84    


 


5/20/20 12:00 97.7 63 22 161/84 (109) 96   


 


5/20/20 09:27  80  154/80    


 


5/20/20 09:27  80      


 


5/20/20 09:27  80  154/80    


 


5/20/20 09:00      Nasal Cannula 4.0 


 


5/20/20 08:00 97.5 80 22 154/80 (104) 96   


 


5/20/20 07:29  66      


 


5/20/20 06:00 97.8 72 22 172/86 (114) 96   


 


5/20/20 05:54    172/86    


 


5/20/20 05:54    172/86    


 


5/20/20 04:00 97.8 72 22 172/86 (114) 96   


 


5/20/20 04:00  65      


 


5/20/20 00:23    176/90    


 


5/20/20 00:00 97.5 77 21 176/90 (118) 95   


 


5/20/20 00:00  58      


 


5/19/20 21:57    177/90    


 


5/19/20 21:00  59  177/90    


 


5/19/20 21:00      Nasal Cannula 4.0 


 


5/19/20 20:00 97.9 61 23 177/90 (119) 96   


 


5/19/20 20:00  52      


 


5/19/20 17:52    159/87    


 


5/19/20 16:00  59      


 


5/19/20 16:00 96.7 102 20 159/87 (111) 96   


 


5/19/20 13:09    199/113    

















Intake and Output  


 


 5/19/20 5/20/20





 19:00 07:00


 


Intake Total 120 ml 


 


Output Total 200 ml 400 ml


 


Balance -80 ml -400 ml


 


  


 


Intake Oral 120 ml 


 


Output Urine Total 200 ml 400 ml


 


# Voids  1


 


# Bowel Movements  1








Laboratory Tests


5/20/20 06:42: 


White Blood Count 4.4L, Red Blood Count 4.74, Hemoglobin 14.8, Hematocrit 42.9, 

Mean Corpuscular Volume 91, Mean Corpuscular Hemoglobin 31.3H, Mean Corpuscular 

Hemoglobin Concent 34.5, Red Cell Distribution Width 11.0L, Platelet Count 154, 

Mean Platelet Volume 6.2L, Neutrophils (%) (Auto) 74.1, Lymphocytes (%) (Auto) 

12.2L, Monocytes (%) (Auto) 12.8H, Eosinophils (%) (Auto) 0.1, Basophils (%) (

Auto) 0.8, Sodium Level 139, Potassium Level 3.1L, Chloride Level 106, Carbon 

Dioxide Level 22, Anion Gap 11, Blood Urea Nitrogen 39H, Creatinine 1.6H, 

Estimat Glomerular Filtration Rate 50.7, Glucose Level 123H, Uric Acid 5.9, 

Calcium Level 8.7, Phosphorus Level 2.2L, Magnesium Level 2.3, Total Bilirubin 

1.3H, Direct Bilirubin 0.8H, Aspartate Amino Transf (AST/SGOT) 68H, Alanine 

Aminotransferase (ALT/SGPT) 53, Alkaline Phosphatase 121H, Troponin I 0.147H, C-

Reactive Protein, Quantitative 12.2H, Pro-B-Type Natriuretic Peptide 9141H, 

Total Protein 7.0, Albumin 2.4L, Globulin 4.6, Albumin/Globulin Ratio 0.5L, 

Digoxin Level 0.7


Height (Feet):  5


Height (Inches):  9.00


Weight (Pounds):  216


General Appearance:  no apparent distress, lethargic


Cardiovascular:  normal rate, arrhythmia


Respiratory/Chest:  decreased breath sounds


Abdomen:  distended











Shane Mello MD May 20, 2020 12:28

## 2020-05-20 NOTE — NUR
NURSE NOTES:

Got report from Shawna GONZALEZ. Pt in stable condition. No s/s of distress or discomfort noted. 
Pt resting in bed comfortably. Bed in low and locked position, call light within reach, 
bedside table within reach. Continue to monitor.

## 2020-05-20 NOTE — CARDIAC ELECTROPHYSIOLOGY PN
Assessment/Plan


Assessment/Plan


1. Non-ST elevation myocardial infarction by elevated troponin.


      Could be due to renal failure, creatinine is 2.  


      The EKG showed atrial fibrillation occasional PVC. 


       Continue aspirin, Lipitor, metoprolol and Imdur.   EF 65%





2. Atrial fibrillation.  On Cardizem  mg daily and Digoxin and Eliquis 5 

bid.


       Dig level 1.1  





3. Hypertension, on Cardizem, Lopressor and Hydralazine 100 tid, Isordil .


4. COPD on albuterol.


5. Occasional PVCs.  Echocardiogram shows ejection fraction of 65%.  


6. Rule out COVID pneumonia.


7. Renal failure, creatinine of 2.








DW RN





Subjective


Subjective


Remained in atrial fib with controlled rate. Troponin mildly elevated but no 

CP. Being fed by sitter





Objective





Last 24 Hour Vital Signs








  Date Time  Temp Pulse Resp B/P (MAP) Pulse Ox O2 Delivery O2 Flow Rate FiO2


 


5/20/20 08:00 97.5 80 22 154/80 (104) 96   


 


5/20/20 06:00 97.8 72 22 172/86 (114) 96   


 


5/20/20 05:54    172/86    


 


5/20/20 05:54    172/86    


 


5/20/20 04:00 97.8 72 22 172/86 (114) 96   


 


5/20/20 04:00  65      


 


5/20/20 00:23    176/90    


 


5/20/20 00:00 97.5 77 21 176/90 (118) 95   


 


5/20/20 00:00  58      


 


5/19/20 21:57    177/90    


 


5/19/20 21:00  59  177/90    


 


5/19/20 21:00      Nasal Cannula 4.0 


 


5/19/20 20:00 97.9 61 23 177/90 (119) 96   


 


5/19/20 20:00  52      


 


5/19/20 17:52    159/87    


 


5/19/20 16:00  59      


 


5/19/20 16:00 96.7 102 20 159/87 (111) 96   


 


5/19/20 13:09    199/113    


 


5/19/20 12:00  67      


 


5/19/20 12:00 97.9 69 20 199/113 (141) 95   


 


5/19/20 11:22    199/113    


 


5/19/20 11:22    199/113    


 


5/19/20 09:22  55  156/104    

















Intake and Output  


 


 5/19/20 5/20/20





 19:00 07:00


 


Intake Total 120 ml 


 


Output Total 200 ml 400 ml


 


Balance -80 ml -400 ml


 


  


 


Intake Oral 120 ml 


 


Output Urine Total 200 ml 400 ml


 


# Voids  1


 


# Bowel Movements  1











Laboratory Tests








Test


  5/20/20


06:42


 


White Blood Count


  4.4 K/UL


(4.8-10.8)  L


 


Red Blood Count


  4.74 M/UL


(4.70-6.10)


 


Hemoglobin


  14.8 G/DL


(14.2-18.0)


 


Hematocrit


  42.9 %


(42.0-52.0)


 


Mean Corpuscular Volume 91 FL (80-99)  


 


Mean Corpuscular Hemoglobin


  31.3 PG


(27.0-31.0)  H


 


Mean Corpuscular Hemoglobin


Concent 34.5 G/DL


(32.0-36.0)


 


Red Cell Distribution Width


  11.0 %


(11.6-14.8)  L


 


Platelet Count


  154 K/UL


(150-450)


 


Mean Platelet Volume


  6.2 FL


(6.5-10.1)  L


 


Neutrophils (%) (Auto)


  74.1 %


(45.0-75.0)


 


Lymphocytes (%) (Auto)


  12.2 %


(20.0-45.0)  L


 


Monocytes (%) (Auto)


  12.8 %


(1.0-10.0)  H


 


Eosinophils (%) (Auto)


  0.1 %


(0.0-3.0)


 


Basophils (%) (Auto)


  0.8 %


(0.0-2.0)


 


Sodium Level


  139 MMOL/L


(136-145)


 


Potassium Level


  3.1 MMOL/L


(3.5-5.1)  L


 


Chloride Level


  106 MMOL/L


()


 


Carbon Dioxide Level


  22 MMOL/L


(21-32)


 


Anion Gap


  11 mmol/L


(5-15)


 


Blood Urea Nitrogen


  39 mg/dL


(7-18)  H


 


Creatinine


  1.6 MG/DL


(0.55-1.30)  H


 


Estimat Glomerular Filtration


Rate 50.7 mL/min


(>60)


 


Glucose Level


  123 MG/DL


()  H


 


Uric Acid


  5.9 MG/DL


(2.6-7.2)


 


Calcium Level


  8.7 MG/DL


(8.5-10.1)


 


Phosphorus Level


  2.2 MG/DL


(2.5-4.9)  L


 


Magnesium Level


  2.3 MG/DL


(1.8-2.4)


 


Total Bilirubin


  1.3 MG/DL


(0.2-1.0)  H


 


Direct Bilirubin


  0.8 MG/DL


(0.0-0.3)  H


 


Aspartate Amino Transf


(AST/SGOT) 68 U/L (15-37)


H


 


Alanine Aminotransferase


(ALT/SGPT) 53 U/L (12-78)


 


 


Alkaline Phosphatase


  121 U/L


()  H


 


Troponin I


  0.147 ng/mL


(0.000-0.056)


 


C-Reactive Protein,


Quantitative 12.2 mg/dL


(0.00-0.90)  H


 


Pro-B-Type Natriuretic Peptide


  9141 pg/mL


(0-125)  H


 


Total Protein


  7.0 G/DL


(6.4-8.2)


 


Albumin


  2.4 G/DL


(3.4-5.0)  L


 


Globulin 4.6 g/dL  


 


Albumin/Globulin Ratio


  0.5 (1.0-2.7)


L


 


Digoxin Level


  0.7 NG/ML


(0.5-2.0)











Microbiology








 Date/Time


Source Procedure


Growth Status


 


 


 5/17/20 21:00


Rectum VRE Culture - Final


NO VANCOMYCIN RESISTANT ENTEROCOCCUS ... Complete








Objective





HEAD AND NECK:  Showed no JVD.


LUNGS:  Coarse rhonchi.


CARDIOVASCULAR:  Regular S1 and S2 with no gallop.


ABDOMEN:  Soft.


EXTREMITIES:  No pitting edema.











Jake Lan MD May 20, 2020 09:18

## 2020-05-20 NOTE — NUR
HAND-OFF: 

Report given to LISA Hubbard and LISA Beard. Patient shows no signs of distress or pain at 
the time. Endorsed plan of care.

## 2020-05-20 NOTE — NUR
NURSE NOTES:

Received report from LISA Christie. Observed pt sleeping, no s/sx of acute distress. Pt 
breathing and unlabored in 4L NC. IV site on R AC patent and asymptomatic. Bed on lowest 
position, call light within reach. Will continue plan of care.

## 2020-05-21 VITALS — DIASTOLIC BLOOD PRESSURE: 68 MMHG | SYSTOLIC BLOOD PRESSURE: 136 MMHG

## 2020-05-21 VITALS — DIASTOLIC BLOOD PRESSURE: 71 MMHG | SYSTOLIC BLOOD PRESSURE: 140 MMHG

## 2020-05-21 VITALS — DIASTOLIC BLOOD PRESSURE: 92 MMHG | SYSTOLIC BLOOD PRESSURE: 171 MMHG

## 2020-05-21 VITALS — DIASTOLIC BLOOD PRESSURE: 80 MMHG | SYSTOLIC BLOOD PRESSURE: 154 MMHG

## 2020-05-21 VITALS — DIASTOLIC BLOOD PRESSURE: 80 MMHG | SYSTOLIC BLOOD PRESSURE: 139 MMHG

## 2020-05-21 VITALS — SYSTOLIC BLOOD PRESSURE: 147 MMHG | DIASTOLIC BLOOD PRESSURE: 67 MMHG

## 2020-05-21 LAB
ADD MANUAL DIFF: NO
ALBUMIN SERPL-MCNC: 2.1 G/DL (ref 3.4–5)
ALBUMIN/GLOB SERPL: 0.5 {RATIO} (ref 1–2.7)
ALP SERPL-CCNC: 129 U/L (ref 46–116)
ALT SERPL-CCNC: 60 U/L (ref 12–78)
ANION GAP SERPL CALC-SCNC: 12 MMOL/L (ref 5–15)
AST SERPL-CCNC: 74 U/L (ref 15–37)
BASOPHILS NFR BLD AUTO: 0.7 % (ref 0–2)
BILIRUB DIRECT SERPL-MCNC: 0.9 MG/DL (ref 0–0.3)
BILIRUB SERPL-MCNC: 1.3 MG/DL (ref 0.2–1)
BUN SERPL-MCNC: 39 MG/DL (ref 7–18)
CALCIUM SERPL-MCNC: 8.5 MG/DL (ref 8.5–10.1)
CHLORIDE SERPL-SCNC: 107 MMOL/L (ref 98–107)
CO2 SERPL-SCNC: 21 MMOL/L (ref 21–32)
CREAT SERPL-MCNC: 1.5 MG/DL (ref 0.55–1.3)
EOSINOPHIL NFR BLD AUTO: 0 % (ref 0–3)
ERYTHROCYTE [DISTWIDTH] IN BLOOD BY AUTOMATED COUNT: 11.1 % (ref 11.6–14.8)
GLOBULIN SER-MCNC: 4.2 G/DL
HCT VFR BLD CALC: 35.4 % (ref 42–52)
HGB BLD-MCNC: 12.4 G/DL (ref 14.2–18)
LYMPHOCYTES NFR BLD AUTO: 9.1 % (ref 20–45)
MCV RBC AUTO: 90 FL (ref 80–99)
MONOCYTES NFR BLD AUTO: 15.4 % (ref 1–10)
NEUTROPHILS NFR BLD AUTO: 74.7 % (ref 45–75)
PHOSPHATE SERPL-MCNC: 2.3 MG/DL (ref 2.5–4.9)
PLATELET # BLD: 165 K/UL (ref 150–450)
POTASSIUM SERPL-SCNC: 3.3 MMOL/L (ref 3.5–5.1)
RBC # BLD AUTO: 3.94 M/UL (ref 4.7–6.1)
SODIUM SERPL-SCNC: 140 MMOL/L (ref 136–145)
WBC # BLD AUTO: 6.6 K/UL (ref 4.8–10.8)

## 2020-05-21 RX ADMIN — HYDRALAZINE HYDROCHLORIDE SCH MG: 50 TABLET ORAL at 05:42

## 2020-05-21 RX ADMIN — DOCUSATE SODIUM SCH MG: 250 CAPSULE, LIQUID FILLED ORAL at 17:17

## 2020-05-21 RX ADMIN — TAMSULOSIN HYDROCHLORIDE SCH MG: 0.4 CAPSULE ORAL at 17:17

## 2020-05-21 RX ADMIN — HYDRALAZINE HYDROCHLORIDE SCH MG: 50 TABLET ORAL at 14:19

## 2020-05-21 RX ADMIN — APIXABAN SCH MG: 2.5 TABLET, FILM COATED ORAL at 09:19

## 2020-05-21 RX ADMIN — Medication SCH EA: at 09:19

## 2020-05-21 RX ADMIN — SORBITOL SOLUTION (BULK) SCH ML: 70 SOLUTION at 05:42

## 2020-05-21 RX ADMIN — HYDRALAZINE HYDROCHLORIDE SCH MG: 50 TABLET ORAL at 21:59

## 2020-05-21 RX ADMIN — SORBITOL SOLUTION (BULK) SCH ML: 70 SOLUTION at 12:08

## 2020-05-21 RX ADMIN — ATORVASTATIN CALCIUM SCH MG: 20 TABLET, FILM COATED ORAL at 21:52

## 2020-05-21 RX ADMIN — ASPIRIN SCH MG: 81 TABLET, DELAYED RELEASE ORAL at 09:19

## 2020-05-21 RX ADMIN — DILTIAZEM HYDROCHLORIDE SCH MG: 180 CAPSULE, EXTENDED RELEASE ORAL at 09:00

## 2020-05-21 RX ADMIN — DOCUSATE SODIUM SCH MG: 250 CAPSULE, LIQUID FILLED ORAL at 09:19

## 2020-05-21 RX ADMIN — DIGOXIN SCH MG: 0.12 TABLET ORAL at 09:00

## 2020-05-21 RX ADMIN — SORBITOL SOLUTION (BULK) SCH ML: 70 SOLUTION at 17:18

## 2020-05-21 RX ADMIN — TAMSULOSIN HYDROCHLORIDE SCH MG: 0.4 CAPSULE ORAL at 09:19

## 2020-05-21 RX ADMIN — APIXABAN SCH MG: 2.5 TABLET, FILM COATED ORAL at 17:18

## 2020-05-21 NOTE — NUR
NURSE NOTES:

Received report from LISA Clark. Observed pt sleeping, breathing even and unlabored in 4L NC, 
no s/sx of acute distress. IV site on R AC patent and asymptomatic. Per CNA, pt refused to 
eat breakfast, will try to offer again when morning meds are due. Bed on lowest position, 
call light within reach. Will continue plan of care.

## 2020-05-21 NOTE — INTERNAL MED PROGRESS NOTE
Subjective


Date of Service:  May 21, 2020


Physician Name


Derek Ortez


Attending Physician


Urban Brown MD





Current Medications








 Medications


  (Trade)  Dose


 Ordered  Sig/Michael


 Route


 PRN Reason  Start Time


 Stop Time Status Last Admin


Dose Admin


 


 Acetaminophen


  (Tylenol)  650 mg  Q6H  PRN


 ORAL


 mild pain/fever  5/17/20 08:48


 6/16/20 08:47   


 


 


 Albuterol Sulfate


  (Proventil MDI)  2 puff  Q4H  PRN


 INH


 Shortness of Breath  5/17/20 07:30


 8/15/20 07:29   


 


 


 Apixaban


  (Eliquis)  2.5 mg  BID


 ORAL


   5/18/20 18:00


 8/16/20 17:59  5/21/20 17:18


 


 


 Artificial Tears


  (Akwa-Tears)  1 drop  BID


 BOTH EYES


   5/17/20 09:00


 6/16/20 08:59  5/21/20 17:18


 


 


 Aspirin


  (Ecotrin)  81 mg  DAILY


 ORAL


   5/17/20 09:00


 7/1/20 08:59  5/21/20 09:19


 


 


 Atorvastatin


 Calcium


  (Lipitor)  20 mg  BEDTIME


 ORAL


   5/17/20 21:00


 8/15/20 20:59  5/20/20 22:07


 


 


 Bisacodyl


  (Dulcolax)  10 mg  PRN


 RECTAL


   5/17/20 00:15


 8/15/20 00:14   


 


 


 Cefepime HCl 1 gm/


 Dextrose  50 ml @ 


 100 mls/hr  EVERY 12  HOURS


 IVPB


   5/17/20 09:00


 5/24/20 08:59  5/21/20 09:20


 


 


 Dextrose


  (Dextrose 50%)  25 ml  Q30M  PRN


 IV


 Hypoglycemia  5/17/20 00:15


 8/15/20 00:14   


 


 


 Dextrose


  (Dextrose 50%)  50 ml  Q30M  PRN


 IV


 Hypoglycemia  5/17/20 00:15


 8/15/20 00:14   


 


 


 Diltiazem HCl


  (Cardizem CD)  180 mg  DAILY


 ORAL


   5/17/20 09:00


 6/16/20 08:59  5/20/20 09:27


 


 


 Docusate Sodium


  (Colace)  250 mg  TWICE A  DAY


 ORAL


   5/17/20 09:00


 6/16/20 08:59  5/21/20 17:17


 


 


 Hydralazine HCl


  (Apresoline)  100 mg  EVERY 8  HOURS


 ORAL


   5/19/20 22:00


 8/15/20 05:59  5/21/20 14:19


 


 


 Isosorbide


 Dinitrate


  (Isordil)  10 mg  Q6HR


 ORAL


   5/17/20 06:00


 6/16/20 05:59  5/21/20 17:17


 


 


 Metoprolol


 Tartrate


  (Lopressor)  25 mg  EVERY 12  HOURS


 ORAL


   5/17/20 21:00


 8/15/20 20:59  5/20/20 22:07


 


 


 Mineral Oil


  (Fleet's Mineral


 Oil Enema)  133 ml  DAILYPRN  PRN


 RECTAL


 CONSTIPATION  5/17/20 05:58


 6/16/20 05:57   


 


 


 Minoxidil


  (Loniten)  2.5 mg  Q4H  PRN


 ORAL


 BP over 170 syst and 100 diast  5/19/20 14:10


 8/17/20 14:09   


 


 


 Multivitamins


 Therapeutic


  (Therapeutic


 Multivitamin)  1 ea  DAILY


 ORAL


   5/17/20 09:00


 6/16/20 08:59  5/21/20 09:19


 


 


 Potassium Chloride


  (K-Dur)  40 meq  TWICE A  DAY


 ORAL


   5/20/20 09:00


 8/18/20 08:59  5/21/20 17:17


 


 


 Sorbitol


  (sorbitoL)  30 ml  EVERY 6  HOURS


 ORAL


   5/17/20 12:00


 6/16/20 11:59  5/21/20 17:18


 


 


 Tamsulosin HCl


  (Flomax)  0.4 mg  BID


 ORAL


   5/19/20 18:00


 6/18/20 17:59  5/21/20 17:17


 








Allergies:  


Coded Allergies:  


     No Known Allergies (Unverified , 5/16/20)


Subjective


81 YO M admitted with shortness of breath.  Now COVID 19 pneumonia.  Cover for 

Int Med-DR Brown





Objective





Last Vital Signs








  Date Time  Temp Pulse Resp B/P (MAP) Pulse Ox O2 Delivery O2 Flow Rate FiO2


 


5/21/20 17:17    139/80    


 


5/21/20 16:00 96.8 70 19  97   


 


5/21/20 09:00      Nasal Cannula 4.0 











Laboratory Tests








Test


  5/21/20


05:50


 


White Blood Count


  6.6 K/UL


(4.8-10.8)


 


Red Blood Count


  3.94 M/UL


(4.70-6.10)  L


 


Hemoglobin


  12.4 G/DL


(14.2-18.0)  L


 


Hematocrit


  35.4 %


(42.0-52.0)  L


 


Mean Corpuscular Volume 90 FL (80-99)  


 


Mean Corpuscular Hemoglobin


  31.5 PG


(27.0-31.0)  H


 


Mean Corpuscular Hemoglobin


Concent 35.0 G/DL


(32.0-36.0)


 


Red Cell Distribution Width


  11.1 %


(11.6-14.8)  L


 


Platelet Count


  165 K/UL


(150-450)


 


Mean Platelet Volume


  6.4 FL


(6.5-10.1)  L


 


Neutrophils (%) (Auto)


  74.7 %


(45.0-75.0)


 


Lymphocytes (%) (Auto)


  9.1 %


(20.0-45.0)  L


 


Monocytes (%) (Auto)


  15.4 %


(1.0-10.0)  H


 


Eosinophils (%) (Auto)


  0.0 %


(0.0-3.0)


 


Basophils (%) (Auto)


  0.7 %


(0.0-2.0)


 


Sodium Level


  140 MMOL/L


(136-145)


 


Potassium Level


  3.3 MMOL/L


(3.5-5.1)  L


 


Chloride Level


  107 MMOL/L


()


 


Carbon Dioxide Level


  21 MMOL/L


(21-32)


 


Anion Gap


  12 mmol/L


(5-15)


 


Blood Urea Nitrogen


  39 mg/dL


(7-18)  H


 


Creatinine


  1.5 MG/DL


(0.55-1.30)  H


 


Estimat Glomerular Filtration


Rate 54.5 mL/min


(>60)


 


Glucose Level


  126 MG/DL


()  H


 


Uric Acid


  6.3 MG/DL


(2.6-7.2)


 


Calcium Level


  8.5 MG/DL


(8.5-10.1)


 


Phosphorus Level


  2.3 MG/DL


(2.5-4.9)  L


 


Magnesium Level


  2.3 MG/DL


(1.8-2.4)


 


Total Bilirubin


  1.3 MG/DL


(0.2-1.0)  H


 


Direct Bilirubin


  0.9 MG/DL


(0.0-0.3)  H


 


Aspartate Amino Transf


(AST/SGOT) 74 U/L (15-37)


H


 


Alanine Aminotransferase


(ALT/SGPT) 60 U/L (12-78)


 


 


Alkaline Phosphatase


  129 U/L


()  H


 


Troponin I


  0.127 ng/mL


(0.000-0.056)


 


Total Protein


  6.3 G/DL


(6.4-8.2)  L


 


Albumin


  2.1 G/DL


(3.4-5.0)  L


 


Globulin 4.2 g/dL  


 


Albumin/Globulin Ratio


  0.5 (1.0-2.7)


L

















Intake and Output  


 


 5/20/20 5/21/20





 19:00 07:00


 


Intake Total 360 ml 


 


Balance 360 ml 


 


  


 


Intake Oral 360 ml 








Objective


PHYSICAL EXAMINATION:


GENERAL:  The patient awake, responsive, no acute distress.


HEENT:  Pupils are equal and reactive to light.  Extraocular movements


intact.  Neck was supple.  No JVD.


LUNGS:  Good air entry.  No wheezing or rales. Decreased in bases.


HEART:  S1 and S2.  Distant heart sounds.  No murmur or gallops.


ABDOMEN:  Soft, nondistended, nontender. Positive bowel sounds.


EXTREMITIES:  No cyanosis, clubbing, edema.


NEUROLOGIC:  Cranial nerves II through XII grossly normal.  The patient


moving left side upper extremities and lower extremity.  Right upper


extremity contracture.  Right lower extremity is decreased motor.





Assessment/Plan


Assessment/Plan


ASSESSMENT:


1. Right lower lobe pneumonia.


2. Acute hypoxemic respiratory failure.


3. COVID-19 positive


4. Acute kidney injury on chronic renal insufficiency.


5. Sepsis.


6. Hypertension.


7. Dyslipidemia.


8. History of CVA with right hemiparesis.


9.  Urinary tract infection=Citrobacter Koseri


10.  Non ST elevated myocardial infarction/elevated troponin





Plan:


1.  COVID-19 pneumonia


  antibiotic = azithromycin and cefepime.


ID=Dr Mills


2. DVT prophylaxis on Lovenox.


3. Code status is DNR.


4. Dr. Lan =Cardiology Electrophysiology,


Dr. Barcenas = Pulmonary Critical Care, 


Dr. Mello = Nephrology, and


Dr. Gentile from Infectious Diseases consultations.


5.  Non ST elevated myocardial infarction/elevated troponin


6.  ABX=azithromycin and cefepime











Derek Ortez MD May 21, 2020 17:56

## 2020-05-21 NOTE — NUR
****TRANSFER UPDATE 

SPOKE WITH [facility]  [facility] YESTERDAY.

CALLED [facility] THIS MORNING AND SPOKE WITH ANALYST [place], STAY AUTHORIZED THRU YESTERDAY 5/20/20

EN TRANSFERRED THIS  TO  [facility].

[facility] REQUESTED STABLE FOR TRANSFER ORDER TO BE FAXED TO THEM BEFORE THEY DECIDE IF THEY 

WANT PATIENT TRANSFERRED TO [facility]

T: [phone]

## 2020-05-21 NOTE — NUR
NURSE NOTES:

Received report from LISA Matos. Observed pt sleeping, HOB elevated for aspiration 
precautions. Breathing even and unlabored on 4L NC, no s/sx of acute distress. IV site on R 
AC patent and asymptomatic. Bed is locked, in lowest position, call light within reach. Will 
continue plan of care.

## 2020-05-21 NOTE — CONSULTATION
DATE OF CONSULTATION:  05/20/2020

HISTORY OF PRESENT ILLNESS:  The patient is an 80-year-old male with a

history of pneumonia, COPD, hypertension, stroke, right-sided hemiparesis,

vascular dementia who has been admitted to the hospital from a nursing

facility due to shortness of breath.  The patient is in isolation to rule

out COVID.  The patient is well known to me from [facility].

The patient is unable to understand process, communicate rationally.



PAST PSYCHIATRIC HISTORY:  Significant for dementia.



PAST MEDICAL HISTORY:  Diabetes mellitus, hypertension, stroke, COPD, and

hyperlipidemia.



ALLERGIES:  No known drug allergies.



SUBSTANCE USE HISTORY:  There is no known history of illicit drug use or

alcohol.



MENTAL STATUS EXAMINATION:  The patient is alert and oriented times self.

He has poor eye contact and was uncooperative with the evaluation.

Minimally verbal.  Mood is dysphoric.  Affect is flat.  Thought process is

concrete.  Thought content, no suicidal or homicidal ideation.  Cognition

is impaired.  Insight and judgment are impaired.



ASSESSMENT:

Axis I  Dementia, vascular type.

Axis II  Deferred.

Axis III  Rule out COVID-19.

Axis IV  Low.

Axis V  20



PLAN:

1. The patient lacks capacity to make decision.  Next of kin should make

decision if he is in an urgent situation.

2. Discussed with the nurse.









  ______________________________________________

  Raulito Weller M.D.





DR:  MONROE

D:  05/20/2020 22:22

T:  05/21/2020 00:14

JOB#:  [id number]

CC:

## 2020-05-21 NOTE — INFECTIOUS DISEASES PROG NOTE
Assessment/Plan


Assessment/Plan








Assessment:


Severe Sepsis 


Pneumonia- 2ry to COVID19


Acute hypoxic respiratory failure on 4l NC


   -5/16 CXR:  Right lower lung pneumonia. Cardiomegaly.


   -SARS COV2 PCR +


   


Probable UTI


  -u/a wbc 30-40, nit neg, leuk +1; ucx >100k C. koseri (pan S)


  Bcx NTD





Fever; SP


No leukocytosis> mild leukopenia


Thrombocytopenia





LIZA, improving





Elevated AST; improving





Accelerated hypertension





NSTEMI





HTN


COPD


 CVA w/ residual R side weakness


 HLD


dysphagia


pneumonia


 DNR/DNI status


SNF resident ([facility])





Plan:


-COntinue CEfepime #5 (abx d #6/7) for UTI


     -5/20 SP Azithromycin #5


     -5/16 SP Ceftriaxone x1, IV Vancomycin x1





-f/u cx


-Monitor CBC/CMP, temperatures


-f/u sp cx, legionella ag urine


-COVID19 isolation 


-aspiration precautions


-Renal, pulm and cards f/u





Thank you for consulting [facility]. Will continue to follow along with 

you.





Discussed with RN,





Subjective


Allergies:  


Coded Allergies:  


     No Known Allergies (Unverified , 5/16/20)


Subjective


afebrile


at 4l NC


Bcx NTD


Cr improving





Objective


Vital Signs





Last 24 Hour Vital Signs








  Date Time  Temp Pulse Resp B/P (MAP) Pulse Ox O2 Delivery O2 Flow Rate FiO2


 


5/21/20 12:08    140/71    


 


5/21/20 12:00 96.5 69 20 140/71 (94) 98   


 


5/21/20 09:00      Nasal Cannula 4.0 


 


5/21/20 09:00  56  147/67    


 


5/21/20 09:00  56      


 


5/21/20 09:00  56  147/67    


 


5/21/20 08:00 97.6 56 19 147/67 (93) 97   


 


5/21/20 07:42  61      


 


5/21/20 05:42    136/68    


 


5/21/20 05:41    136/68    


 


5/21/20 04:00  58      


 


5/21/20 04:00 97.7 60 18 136/68 (90) 99   


 


5/21/20 00:53    154/80    


 


5/21/20 00:00 98.4 68 18 154/80 (104) 97   


 


5/21/20 00:00  63      


 


5/20/20 22:07  72  157/81    


 


5/20/20 22:06    157/81    


 


5/20/20 21:00      Nasal Cannula 4.0 


 


5/20/20 20:00 98.1 72 18 157/81 (106) 98   


 


5/20/20 20:00  72      


 


5/20/20 17:10    166/81    


 


5/20/20 16:00 99.7 53 22 166/81 (109) 96   


 


5/20/20 15:09  68      


 


5/20/20 14:19    161/84    








Height (Feet):  5


Height (Inches):  9.00


Weight (Pounds):  216


Objective


Gen: no respiratory distress


Head: normocephalic


Lungs: no tachypnea or use of accessory resp muscles


Abd: not distended


HEENT: NC in place





Laboratory Tests








Test


  5/21/20


05:50


 


White Blood Count


  6.6 K/UL


(4.8-10.8)


 


Red Blood Count


  3.94 M/UL


(4.70-6.10)  L


 


Hemoglobin


  12.4 G/DL


(14.2-18.0)  L


 


Hematocrit


  35.4 %


(42.0-52.0)  L


 


Mean Corpuscular Volume 90 FL (80-99)  


 


Mean Corpuscular Hemoglobin


  31.5 PG


(27.0-31.0)  H


 


Mean Corpuscular Hemoglobin


Concent 35.0 G/DL


(32.0-36.0)


 


Red Cell Distribution Width


  11.1 %


(11.6-14.8)  L


 


Platelet Count


  165 K/UL


(150-450)


 


Mean Platelet Volume


  6.4 FL


(6.5-10.1)  L


 


Neutrophils (%) (Auto)


  74.7 %


(45.0-75.0)


 


Lymphocytes (%) (Auto)


  9.1 %


(20.0-45.0)  L


 


Monocytes (%) (Auto)


  15.4 %


(1.0-10.0)  H


 


Eosinophils (%) (Auto)


  0.0 %


(0.0-3.0)


 


Basophils (%) (Auto)


  0.7 %


(0.0-2.0)


 


Sodium Level


  140 MMOL/L


(136-145)


 


Potassium Level


  3.3 MMOL/L


(3.5-5.1)  L


 


Chloride Level


  107 MMOL/L


()


 


Carbon Dioxide Level


  21 MMOL/L


(21-32)


 


Anion Gap


  12 mmol/L


(5-15)


 


Blood Urea Nitrogen


  39 mg/dL


(7-18)  H


 


Creatinine


  1.5 MG/DL


(0.55-1.30)  H


 


Estimat Glomerular Filtration


Rate 54.5 mL/min


(>60)


 


Glucose Level


  126 MG/DL


()  H


 


Uric Acid


  6.3 MG/DL


(2.6-7.2)


 


Calcium Level


  8.5 MG/DL


(8.5-10.1)


 


Phosphorus Level


  2.3 MG/DL


(2.5-4.9)  L


 


Magnesium Level


  2.3 MG/DL


(1.8-2.4)


 


Total Bilirubin


  1.3 MG/DL


(0.2-1.0)  H


 


Direct Bilirubin


  0.9 MG/DL


(0.0-0.3)  H


 


Aspartate Amino Transf


(AST/SGOT) 74 U/L (15-37)


H


 


Alanine Aminotransferase


(ALT/SGPT) 60 U/L (12-78)


 


 


Alkaline Phosphatase


  129 U/L


()  H


 


Troponin I


  0.127 ng/mL


(0.000-0.056)


 


Total Protein


  6.3 G/DL


(6.4-8.2)  L


 


Albumin


  2.1 G/DL


(3.4-5.0)  L


 


Globulin 4.2 g/dL  


 


Albumin/Globulin Ratio


  0.5 (1.0-2.7)


L











Current Medications








 Medications


  (Trade)  Dose


 Ordered  Sig/Michael


 Route


 PRN Reason  Start Time


 Stop Time Status Last Admin


Dose Admin


 


 Acetaminophen


  (Tylenol)  650 mg  Q6H  PRN


 ORAL


 mild pain/fever  5/17/20 08:48


 6/16/20 08:47   


 


 


 Albuterol Sulfate


  (Proventil MDI)  2 puff  Q4H  PRN


 INH


 Shortness of Breath  5/17/20 07:30


 8/15/20 07:29   


 


 


 Apixaban


  (Eliquis)  2.5 mg  BID


 ORAL


   5/18/20 18:00


 8/16/20 17:59  5/21/20 09:19


 


 


 Artificial Tears


  (Akwa-Tears)  1 drop  BID


 BOTH EYES


   5/17/20 09:00


 6/16/20 08:59  5/21/20 09:20


 


 


 Aspirin


  (Ecotrin)  81 mg  DAILY


 ORAL


   5/17/20 09:00


 7/1/20 08:59  5/21/20 09:19


 


 


 Atorvastatin


 Calcium


  (Lipitor)  20 mg  BEDTIME


 ORAL


   5/17/20 21:00


 8/15/20 20:59  5/20/20 22:07


 


 


 Azithromycin 500


 mg/Dextrose  275 ml @ 


 275 mls/hr  Q24H


 IV


   5/17/20 21:00


 5/23/20 21:59  5/20/20 22:11


 


 


 Bisacodyl


  (Dulcolax)  10 mg  PRN


 RECTAL


   5/17/20 00:15


 8/15/20 00:14   


 


 


 Cefepime HCl 1 gm/


 Dextrose  50 ml @ 


 100 mls/hr  EVERY 12  HOURS


 IVPB


   5/17/20 09:00


 5/24/20 08:59  5/21/20 09:20


 


 


 Dextrose


  (Dextrose 50%)  25 ml  Q30M  PRN


 IV


 Hypoglycemia  5/17/20 00:15


 8/15/20 00:14   


 


 


 Dextrose


  (Dextrose 50%)  50 ml  Q30M  PRN


 IV


 Hypoglycemia  5/17/20 00:15


 8/15/20 00:14   


 


 


 Digoxin


  (Lanoxin)  0.125 mg  DAILY


 ORAL


   5/17/20 09:00


 8/15/20 08:59  5/20/20 09:27


 


 


 Diltiazem HCl


  (Cardizem CD)  180 mg  DAILY


 ORAL


   5/17/20 09:00


 6/16/20 08:59  5/20/20 09:27


 


 


 Docusate Sodium


  (Colace)  250 mg  TWICE A  DAY


 ORAL


   5/17/20 09:00


 6/16/20 08:59  5/21/20 09:19


 


 


 Hydralazine HCl


  (Apresoline)  100 mg  EVERY 8  HOURS


 ORAL


   5/19/20 22:00


 8/15/20 05:59  5/21/20 05:42


 


 


 Isosorbide


 Dinitrate


  (Isordil)  10 mg  Q6HR


 ORAL


   5/17/20 06:00


 6/16/20 05:59  5/21/20 12:08


 


 


 Metoprolol


 Tartrate


  (Lopressor)  25 mg  EVERY 12  HOURS


 ORAL


   5/17/20 21:00


 8/15/20 20:59  5/20/20 22:07


 


 


 Mineral Oil


  (Fleet's Mineral


 Oil Enema)  133 ml  DAILYPRN  PRN


 RECTAL


 CONSTIPATION  5/17/20 05:58


 6/16/20 05:57   


 


 


 Minoxidil


  (Loniten)  2.5 mg  Q4H  PRN


 ORAL


 BP over 170 syst and 100 diast  5/19/20 14:10


 8/17/20 14:09   


 


 


 Multivitamins


 Therapeutic


  (Therapeutic


 Multivitamin)  1 ea  DAILY


 ORAL


   5/17/20 09:00


 6/16/20 08:59  5/21/20 09:19


 


 


 Potassium


 Phosphate 20 mm/


 Sodium Chloride  281.6667


 ml @ 


 46.944 m...  ONCE  ONCE


 IV


   5/21/20 10:00


 5/21/20 15:59  5/21/20 10:35


 


 


 Potassium Chloride


  (K-Dur)  40 meq  TWICE A  DAY


 ORAL


   5/20/20 09:00


 8/18/20 08:59  5/21/20 09:19


 


 


 Sorbitol


  (sorbitoL)  30 ml  EVERY 6  HOURS


 ORAL


   5/17/20 12:00


 6/16/20 11:59  5/21/20 12:08


 


 


 Tamsulosin HCl


  (Flomax)  0.4 mg  BID


 ORAL


   5/19/20 18:00


 6/18/20 17:59  5/21/20 09:19


 

















Tatum Mills M.D. May 21, 2020 12:56

## 2020-05-21 NOTE — NUR
NURSE NOTES:

Made Dr Lan aware regarding troponin result, and regarding pt high blood pressure but low 
HR.

## 2020-05-21 NOTE — NEPHROLOGY PROGRESS NOTE
Assessment/Plan


Problem List:  


(1) Renal failure (ARF), acute on chronic


(2) NSTEMI (non-ST elevated myocardial infarction)


(3) Right lower lobe pneumonia


(4) Suspected COVID-19 virus infection


(5) HTN (hypertension)


(6) UTI (urinary tract infection)


Assessment





Acute renal failure most likely superimposed on underlying chronic kidney 

disease


Acute hypoxic respiratory failure, pneumonia, suspected COVID-19


Sepsis


Elevated troponin


Suspected congestive heart failure


Hypertension, hypertension urgency on admission


Evidence of UTI


Plan








Blood pressure now controlled


Watch slow heart rate.


Adjust blood pressure medication as needed


Monitor renal parameters


Potassium, phosphorus, magnesium supplement as needed





Previously:


Slow hydration


Watch for CHF symptoms


Pulmonary toilet


Antibiotics


Urine studies


Keep the blood pressure and blood sugar in check


Monitor renal parameters


Per orders


Per consultants





Subjective


ROS Limited/Unobtainable:  No


Constitutional:  Reports: malaise, weakness





Objective


Objective





Last 24 Hour Vital Signs








  Date Time  Temp Pulse Resp B/P (MAP) Pulse Ox O2 Delivery O2 Flow Rate FiO2


 


5/21/20 09:00  56  147/67    


 


5/21/20 09:00  56      


 


5/21/20 09:00  56  147/67    


 


5/21/20 08:00 97.6 56 19 147/67 (93) 97   


 


5/21/20 05:42    136/68    


 


5/21/20 05:41    136/68    


 


5/21/20 04:00  58      


 


5/21/20 04:00 97.7 60 18 136/68 (90) 99   


 


5/21/20 00:53    154/80    


 


5/21/20 00:00 98.4 68 18 154/80 (104) 97   


 


5/21/20 00:00  63      


 


5/20/20 22:07  72  157/81    


 


5/20/20 22:06    157/81    


 


5/20/20 21:00      Nasal Cannula 4.0 


 


5/20/20 20:00 98.1 72 18 157/81 (106) 98   


 


5/20/20 20:00  72      


 


5/20/20 17:10    166/81    


 


5/20/20 16:00 99.7 53 22 166/81 (109) 96   


 


5/20/20 15:09  68      


 


5/20/20 14:19    161/84    


 


5/20/20 12:20    161/84    


 


5/20/20 12:00 97.7 63 22 161/84 (109) 96   


 


5/20/20 11:32  59      

















Intake and Output  


 


 5/20/20 5/21/20





 19:00 07:00


 


Intake Total 360 ml 


 


Balance 360 ml 


 


  


 


Intake Oral 360 ml 








Laboratory Tests


5/21/20 05:50: 


White Blood Count 6.6, Red Blood Count 3.94L, Hemoglobin 12.4L, Hematocrit 35.4L

, Mean Corpuscular Volume 90, Mean Corpuscular Hemoglobin 31.5H, Mean 

Corpuscular Hemoglobin Concent 35.0, Red Cell Distribution Width 11.1L, 

Platelet Count 165, Mean Platelet Volume 6.4L, Neutrophils (%) (Auto) 74.7, 

Lymphocytes (%) (Auto) 9.1L, Monocytes (%) (Auto) 15.4H, Eosinophils (%) (Auto) 

0.0, Basophils (%) (Auto) 0.7, Sodium Level 140, Potassium Level 3.3L, Chloride 

Level 107, Carbon Dioxide Level 21, Anion Gap 12, Blood Urea Nitrogen 39H, 

Creatinine 1.5H, Estimat Glomerular Filtration Rate 54.5, Glucose Level 126H, 

Uric Acid 6.3, Calcium Level 8.5, Phosphorus Level 2.3L, Magnesium Level 2.3, 

Total Bilirubin 1.3H, Direct Bilirubin 0.9H, Aspartate Amino Transf (AST/SGOT) 

74H, Alanine Aminotransferase (ALT/SGPT) 60, Alkaline Phosphatase 129H, 

Troponin I 0.127H, Total Protein 6.3L, Albumin 2.1L, Globulin 4.2, Albumin/

Globulin Ratio 0.5L


Height (Feet):  5


Height (Inches):  9.00


Weight (Pounds):  216


General Appearance:  no apparent distress


Cardiovascular:  bradycardia


Respiratory/Chest:  decreased breath sounds


Abdomen:  distended


Objective


No change











Shane Mello MD May 21, 2020 10:46

## 2020-05-21 NOTE — NUR
*-* INSURANCE *-*



UPDATED CLINICALS AND REVIEW HAVE BEEN FAXED TO:



[employer] ([facility]) 

[phone]   Work

[phone]   Fax

[phone]   FAX

[phone]   FAX

## 2020-05-21 NOTE — NUR
NURSE NOTES:

Family member, pt's sister called and wants to speak with pt in the morning as pt is 
sleeping

## 2020-05-21 NOTE — PULMONOLOGY PROGRESS NOTE
Subjective


ROS Limited/Unobtainable:  No


Constitutional:  Reports: no symptoms


HEENT:  Repors: no symptoms


Cardiovascular:  Reports: no symptoms


Allergies:  


Coded Allergies:  


     No Known Allergies (Unverified , 5/16/20)


All Systems:  reviewed and negative except above





Objective





Last 24 Hour Vital Signs








  Date Time  Temp Pulse Resp B/P (MAP) Pulse Ox O2 Delivery O2 Flow Rate FiO2


 


5/21/20 09:00      Nasal Cannula 4.0 


 


5/21/20 09:00  56  147/67    


 


5/21/20 09:00  56      


 


5/21/20 09:00  56  147/67    


 


5/21/20 08:00 97.6 56 19 147/67 (93) 97   


 


5/21/20 07:42  61      


 


5/21/20 05:42    136/68    


 


5/21/20 05:41    136/68    


 


5/21/20 04:00  58      


 


5/21/20 04:00 97.7 60 18 136/68 (90) 99   


 


5/21/20 00:53    154/80    


 


5/21/20 00:00 98.4 68 18 154/80 (104) 97   


 


5/21/20 00:00  63      


 


5/20/20 22:07  72  157/81    


 


5/20/20 22:06    157/81    


 


5/20/20 21:00      Nasal Cannula 4.0 


 


5/20/20 20:00 98.1 72 18 157/81 (106) 98   


 


5/20/20 20:00  72      


 


5/20/20 17:10    166/81    


 


5/20/20 16:00 99.7 53 22 166/81 (109) 96   


 


5/20/20 15:09  68      


 


5/20/20 14:19    161/84    


 


5/20/20 12:20    161/84    


 


5/20/20 12:00 97.7 63 22 161/84 (109) 96   


 


5/20/20 11:32  59      

















Intake and Output  


 


 5/20/20 5/21/20





 19:00 07:00


 


Intake Total 360 ml 


 


Balance 360 ml 


 


  


 


Intake Oral 360 ml 








General Appearance:  WD/WN


HEENT:  normocephalic


Respiratory:  chest wall non-tender, rhonchi - left, rhonchi - right


Cardiovascular:  normal peripheral pulses, normal rate, regular rhythm


Abdomen:  normal bowel sounds, soft, non tender


Genitourinary:  normal external genitalia


Extremities:  no cyanosis


Skin:  no rash


Neurologic:  CNs II-XII grossly normal, no motor/sensory deficits


Laboratory Tests


5/21/20 05:50: 


White Blood Count 6.6, Red Blood Count 3.94L, Hemoglobin 12.4L, Hematocrit 35.4L

, Mean Corpuscular Volume 90, Mean Corpuscular Hemoglobin 31.5H, Mean 

Corpuscular Hemoglobin Concent 35.0, Red Cell Distribution Width 11.1L, 

Platelet Count 165, Mean Platelet Volume 6.4L, Neutrophils (%) (Auto) 74.7, 

Lymphocytes (%) (Auto) 9.1L, Monocytes (%) (Auto) 15.4H, Eosinophils (%) (Auto) 

0.0, Basophils (%) (Auto) 0.7, Sodium Level 140, Potassium Level 3.3L, Chloride 

Level 107, Carbon Dioxide Level 21, Anion Gap 12, Blood Urea Nitrogen 39H, 

Creatinine 1.5H, Estimat Glomerular Filtration Rate 54.5, Glucose Level 126H, 

Uric Acid 6.3, Calcium Level 8.5, Phosphorus Level 2.3L, Magnesium Level 2.3, 

Total Bilirubin 1.3H, Direct Bilirubin 0.9H, Aspartate Amino Transf (AST/SGOT) 

74H, Alanine Aminotransferase (ALT/SGPT) 60, Alkaline Phosphatase 129H, 

Troponin I 0.127H, Total Protein 6.3L, Albumin 2.1L, Globulin 4.2, Albumin/

Globulin Ratio 0.5L





Current Medications








 Medications


  (Trade)  Dose


 Ordered  Sig/Michael


 Route


 PRN Reason  Start Time


 Stop Time Status Last Admin


Dose Admin


 


 Acetaminophen


  (Tylenol)  650 mg  Q6H  PRN


 ORAL


 mild pain/fever  5/17/20 08:48


 6/16/20 08:47   


 


 


 Albuterol Sulfate


  (Proventil MDI)  2 puff  Q4H  PRN


 INH


 Shortness of Breath  5/17/20 07:30


 8/15/20 07:29   


 


 


 Apixaban


  (Eliquis)  2.5 mg  BID


 ORAL


   5/18/20 18:00


 8/16/20 17:59  5/21/20 09:19


 


 


 Artificial Tears


  (Akwa-Tears)  1 drop  BID


 BOTH EYES


   5/17/20 09:00


 6/16/20 08:59  5/21/20 09:20


 


 


 Aspirin


  (Ecotrin)  81 mg  DAILY


 ORAL


   5/17/20 09:00


 7/1/20 08:59  5/21/20 09:19


 


 


 Atorvastatin


 Calcium


  (Lipitor)  20 mg  BEDTIME


 ORAL


   5/17/20 21:00


 8/15/20 20:59  5/20/20 22:07


 


 


 Azithromycin 500


 mg/Dextrose  275 ml @ 


 275 mls/hr  Q24H


 IV


   5/17/20 21:00


 5/23/20 21:59  5/20/20 22:11


 


 


 Bisacodyl


  (Dulcolax)  10 mg  PRN


 RECTAL


   5/17/20 00:15


 8/15/20 00:14   


 


 


 Cefepime HCl 1 gm/


 Dextrose  50 ml @ 


 100 mls/hr  EVERY 12  HOURS


 IVPB


   5/17/20 09:00


 5/24/20 08:59  5/21/20 09:20


 


 


 Dextrose


  (Dextrose 50%)  25 ml  Q30M  PRN


 IV


 Hypoglycemia  5/17/20 00:15


 8/15/20 00:14   


 


 


 Dextrose


  (Dextrose 50%)  50 ml  Q30M  PRN


 IV


 Hypoglycemia  5/17/20 00:15


 8/15/20 00:14   


 


 


 Digoxin


  (Lanoxin)  0.125 mg  DAILY


 ORAL


   5/17/20 09:00


 8/15/20 08:59  5/20/20 09:27


 


 


 Diltiazem HCl


  (Cardizem CD)  180 mg  DAILY


 ORAL


   5/17/20 09:00


 6/16/20 08:59  5/20/20 09:27


 


 


 Docusate Sodium


  (Colace)  250 mg  TWICE A  DAY


 ORAL


   5/17/20 09:00


 6/16/20 08:59  5/21/20 09:19


 


 


 Hydralazine HCl


  (Apresoline)  100 mg  EVERY 8  HOURS


 ORAL


   5/19/20 22:00


 8/15/20 05:59  5/21/20 05:42


 


 


 Isosorbide


 Dinitrate


  (Isordil)  10 mg  Q6HR


 ORAL


   5/17/20 06:00


 6/16/20 05:59  5/21/20 05:41


 


 


 Metoprolol


 Tartrate


  (Lopressor)  25 mg  EVERY 12  HOURS


 ORAL


   5/17/20 21:00


 8/15/20 20:59  5/20/20 22:07


 


 


 Mineral Oil


  (Fleet's Mineral


 Oil Enema)  133 ml  DAILYPRN  PRN


 RECTAL


 CONSTIPATION  5/17/20 05:58


 6/16/20 05:57   


 


 


 Minoxidil


  (Loniten)  2.5 mg  Q4H  PRN


 ORAL


 BP over 170 syst and 100 diast  5/19/20 14:10


 8/17/20 14:09   


 


 


 Multivitamins


 Therapeutic


  (Therapeutic


 Multivitamin)  1 ea  DAILY


 ORAL


   5/17/20 09:00


 6/16/20 08:59  5/21/20 09:19


 


 


 Potassium


 Phosphate 20 mm/


 Sodium Chloride  281.6667


 ml @ 


 46.944 m...  ONCE  ONCE


 IV


   5/21/20 10:00


 5/21/20 15:59  5/21/20 10:35


 


 


 Potassium Chloride


  (K-Dur)  40 meq  TWICE A  DAY


 ORAL


   5/20/20 09:00


 8/18/20 08:59  5/21/20 09:19


 


 


 Sorbitol


  (sorbitoL)  30 ml  EVERY 6  HOURS


 ORAL


   5/17/20 12:00


 6/16/20 11:59  5/21/20 05:42


 


 


 Tamsulosin HCl


  (Flomax)  0.4 mg  BID


 ORAL


   5/19/20 18:00


 6/18/20 17:59  5/21/20 09:19


 











Assessment/Plan


Problems:  


(1) 2019 novel coronavirus disease (COVID-19)


(2) Right lower lobe pneumonia


(3) UTI (urinary tract infection)


(4) Moderate pulmonary arterial systolic hypertension


(5) Renal failure (ARF), acute on chronic


(6) Chronic atrial fibrillation


(7) History of CVA (cerebrovascular accident)


(8) HTN (hypertension)


Assessment/Plan


stable


all reviewed


afebrile > 48 hours


COVID positive


EF noted, 65%


vital signs stable


bun/creatinine improving


check cultures, Urine Citrobacter


iv abx


monitor BP and heart rate.











Doe Barcenas MD May 21, 2020 11:16

## 2020-05-21 NOTE — CARDIAC ELECTROPHYSIOLOGY PN
Assessment/Plan


Assessment/Plan


1. Non-ST elevation myocardial infarction by elevated troponin.


      Could be due to renal failure, creatinine is 2.  


      The EKG showed atrial fibrillation occasional PVC. 


       Continue aspirin, Lipitor, metoprolol and Imdur.   EF 65%





2. Atrial fibrillation.  On Cardizem  mg daily and Eliquis 5 bid.


       DC Digoxin for bradycardia





3. Hypertension, on Cardizem, Lopressor,Hydralazine 100 tid, Isordil .


4. COPD on albuterol.


5. Occasional PVCs.  Echocardiogram shows ejection fraction of 65%.  


6. Rule out COVID pneumonia.


7. Renal failure, creatinine of 2.








DW RN





Subjective


Subjective


Remained in atrial fib with milady at times.Rescheduled for Covid . HR was in 

50s and cardiac meds were held. Troponin mildly elevated but no CP





Objective





Last 24 Hour Vital Signs








  Date Time  Temp Pulse Resp B/P (MAP) Pulse Ox O2 Delivery O2 Flow Rate FiO2


 


5/21/20 16:00 96.8 70 19 139/80 (99) 97   


 


5/21/20 14:19    140/71    


 


5/21/20 12:08    140/71    


 


5/21/20 12:00 96.5 69 20 140/71 (94) 98   


 


5/21/20 11:25  55      


 


5/21/20 09:00      Nasal Cannula 4.0 


 


5/21/20 09:00  56  147/67    


 


5/21/20 09:00  56      


 


5/21/20 09:00  56  147/67    


 


5/21/20 08:00 97.6 56 19 147/67 (93) 97   


 


5/21/20 07:42  61      


 


5/21/20 05:42    136/68    


 


5/21/20 05:41    136/68    


 


5/21/20 04:00  58      


 


5/21/20 04:00 97.7 60 18 136/68 (90) 99   


 


5/21/20 00:53    154/80    


 


5/21/20 00:00 98.4 68 18 154/80 (104) 97   


 


5/21/20 00:00  63      


 


5/20/20 22:07  72  157/81    


 


5/20/20 22:06    157/81    


 


5/20/20 21:00      Nasal Cannula 4.0 


 


5/20/20 20:00 98.1 72 18 157/81 (106) 98   


 


5/20/20 20:00  72      


 


5/20/20 17:10    166/81    

















Intake and Output  


 


 5/20/20 5/21/20





 19:00 07:00


 


Intake Total 360 ml 


 


Balance 360 ml 


 


  


 


Intake Oral 360 ml 











Laboratory Tests








Test


  5/21/20


05:50


 


White Blood Count


  6.6 K/UL


(4.8-10.8)


 


Red Blood Count


  3.94 M/UL


(4.70-6.10)  L


 


Hemoglobin


  12.4 G/DL


(14.2-18.0)  L


 


Hematocrit


  35.4 %


(42.0-52.0)  L


 


Mean Corpuscular Volume 90 FL (80-99)  


 


Mean Corpuscular Hemoglobin


  31.5 PG


(27.0-31.0)  H


 


Mean Corpuscular Hemoglobin


Concent 35.0 G/DL


(32.0-36.0)


 


Red Cell Distribution Width


  11.1 %


(11.6-14.8)  L


 


Platelet Count


  165 K/UL


(150-450)


 


Mean Platelet Volume


  6.4 FL


(6.5-10.1)  L


 


Neutrophils (%) (Auto)


  74.7 %


(45.0-75.0)


 


Lymphocytes (%) (Auto)


  9.1 %


(20.0-45.0)  L


 


Monocytes (%) (Auto)


  15.4 %


(1.0-10.0)  H


 


Eosinophils (%) (Auto)


  0.0 %


(0.0-3.0)


 


Basophils (%) (Auto)


  0.7 %


(0.0-2.0)


 


Sodium Level


  140 MMOL/L


(136-145)


 


Potassium Level


  3.3 MMOL/L


(3.5-5.1)  L


 


Chloride Level


  107 MMOL/L


()


 


Carbon Dioxide Level


  21 MMOL/L


(21-32)


 


Anion Gap


  12 mmol/L


(5-15)


 


Blood Urea Nitrogen


  39 mg/dL


(7-18)  H


 


Creatinine


  1.5 MG/DL


(0.55-1.30)  H


 


Estimat Glomerular Filtration


Rate 54.5 mL/min


(>60)


 


Glucose Level


  126 MG/DL


()  H


 


Uric Acid


  6.3 MG/DL


(2.6-7.2)


 


Calcium Level


  8.5 MG/DL


(8.5-10.1)


 


Phosphorus Level


  2.3 MG/DL


(2.5-4.9)  L


 


Magnesium Level


  2.3 MG/DL


(1.8-2.4)


 


Total Bilirubin


  1.3 MG/DL


(0.2-1.0)  H


 


Direct Bilirubin


  0.9 MG/DL


(0.0-0.3)  H


 


Aspartate Amino Transf


(AST/SGOT) 74 U/L (15-37)


H


 


Alanine Aminotransferase


(ALT/SGPT) 60 U/L (12-78)


 


 


Alkaline Phosphatase


  129 U/L


()  H


 


Troponin I


  0.127 ng/mL


(0.000-0.056)


 


Total Protein


  6.3 G/DL


(6.4-8.2)  L


 


Albumin


  2.1 G/DL


(3.4-5.0)  L


 


Globulin 4.2 g/dL  


 


Albumin/Globulin Ratio


  0.5 (1.0-2.7)


L








Objective





HEAD AND NECK:  Showed no JVD.


LUNGS:  Coarse rhonchi.


CARDIOVASCULAR:  Irregular S1 and S2 with no gallop.


ABDOMEN:  Soft.


EXTREMITIES:  No pitting edema.











Jake Lan MD May 21, 2020 16:15

## 2020-05-22 VITALS — DIASTOLIC BLOOD PRESSURE: 90 MMHG | SYSTOLIC BLOOD PRESSURE: 154 MMHG

## 2020-05-22 VITALS — SYSTOLIC BLOOD PRESSURE: 159 MMHG | DIASTOLIC BLOOD PRESSURE: 82 MMHG

## 2020-05-22 VITALS — SYSTOLIC BLOOD PRESSURE: 149 MMHG | DIASTOLIC BLOOD PRESSURE: 68 MMHG

## 2020-05-22 VITALS — DIASTOLIC BLOOD PRESSURE: 77 MMHG | SYSTOLIC BLOOD PRESSURE: 139 MMHG

## 2020-05-22 VITALS — DIASTOLIC BLOOD PRESSURE: 68 MMHG | SYSTOLIC BLOOD PRESSURE: 140 MMHG

## 2020-05-22 LAB
ADD MANUAL DIFF: NO
ALBUMIN SERPL-MCNC: 2.1 G/DL (ref 3.4–5)
ALBUMIN/GLOB SERPL: 0.5 {RATIO} (ref 1–2.7)
ALP SERPL-CCNC: 130 U/L (ref 46–116)
ALT SERPL-CCNC: 61 U/L (ref 12–78)
ANION GAP SERPL CALC-SCNC: 12 MMOL/L (ref 5–15)
AST SERPL-CCNC: 88 U/L (ref 15–37)
BASOPHILS NFR BLD AUTO: 0.8 % (ref 0–2)
BILIRUB DIRECT SERPL-MCNC: 0.5 MG/DL (ref 0–0.3)
BILIRUB SERPL-MCNC: 1.3 MG/DL (ref 0.2–1)
BUN SERPL-MCNC: 40 MG/DL (ref 7–18)
CALCIUM SERPL-MCNC: 8.6 MG/DL (ref 8.5–10.1)
CHLORIDE SERPL-SCNC: 110 MMOL/L (ref 98–107)
CO2 SERPL-SCNC: 20 MMOL/L (ref 21–32)
CREAT SERPL-MCNC: 1.6 MG/DL (ref 0.55–1.3)
EOSINOPHIL NFR BLD AUTO: 0 % (ref 0–3)
ERYTHROCYTE [DISTWIDTH] IN BLOOD BY AUTOMATED COUNT: 11.1 % (ref 11.6–14.8)
GLOBULIN SER-MCNC: 4.5 G/DL
HCT VFR BLD CALC: 36.9 % (ref 42–52)
HGB BLD-MCNC: 13.1 G/DL (ref 14.2–18)
LYMPHOCYTES NFR BLD AUTO: 11.9 % (ref 20–45)
MCV RBC AUTO: 91 FL (ref 80–99)
MONOCYTES NFR BLD AUTO: 11.1 % (ref 1–10)
NEUTROPHILS NFR BLD AUTO: 76.1 % (ref 45–75)
PHOSPHATE SERPL-MCNC: 2.8 MG/DL (ref 2.5–4.9)
PLATELET # BLD: 180 K/UL (ref 150–450)
POTASSIUM SERPL-SCNC: 4 MMOL/L (ref 3.5–5.1)
RBC # BLD AUTO: 4.07 M/UL (ref 4.7–6.1)
SODIUM SERPL-SCNC: 142 MMOL/L (ref 136–145)
WBC # BLD AUTO: 5.8 K/UL (ref 4.8–10.8)

## 2020-05-22 RX ADMIN — TAMSULOSIN HYDROCHLORIDE SCH MG: 0.4 CAPSULE ORAL at 18:26

## 2020-05-22 RX ADMIN — ASPIRIN SCH MG: 81 TABLET, DELAYED RELEASE ORAL at 09:21

## 2020-05-22 RX ADMIN — DOCUSATE SODIUM SCH MG: 250 CAPSULE, LIQUID FILLED ORAL at 18:25

## 2020-05-22 RX ADMIN — HYDRALAZINE HYDROCHLORIDE SCH MG: 50 TABLET ORAL at 05:27

## 2020-05-22 RX ADMIN — Medication SCH EA: at 09:22

## 2020-05-22 RX ADMIN — SORBITOL SOLUTION (BULK) SCH ML: 70 SOLUTION at 18:00

## 2020-05-22 RX ADMIN — SORBITOL SOLUTION (BULK) SCH ML: 70 SOLUTION at 05:12

## 2020-05-22 RX ADMIN — APIXABAN SCH MG: 2.5 TABLET, FILM COATED ORAL at 09:22

## 2020-05-22 RX ADMIN — TAMSULOSIN HYDROCHLORIDE SCH MG: 0.4 CAPSULE ORAL at 09:22

## 2020-05-22 RX ADMIN — HYDRALAZINE HYDROCHLORIDE SCH MG: 50 TABLET ORAL at 14:28

## 2020-05-22 RX ADMIN — APIXABAN SCH MG: 2.5 TABLET, FILM COATED ORAL at 18:25

## 2020-05-22 RX ADMIN — SORBITOL SOLUTION (BULK) SCH ML: 70 SOLUTION at 02:30

## 2020-05-22 RX ADMIN — DILTIAZEM HYDROCHLORIDE SCH MG: 180 CAPSULE, EXTENDED RELEASE ORAL at 09:21

## 2020-05-22 RX ADMIN — DOCUSATE SODIUM SCH MG: 250 CAPSULE, LIQUID FILLED ORAL at 09:21

## 2020-05-22 RX ADMIN — SORBITOL SOLUTION (BULK) SCH ML: 70 SOLUTION at 12:47

## 2020-05-22 NOTE — CARDIAC ELECTROPHYSIOLOGY PN
Assessment/Plan


Assessment/Plan


1. Elevated troponin. Could be due to renal failure, creatinine is 2.  


        The EKG showed atrial fibrillation occasional PVC. 


         Continue aspirin, Lipitor, and Imdur.   EF 65%





2. Atrial fibrillation.  On Cardizem  mg daily and Eliquis 5 bid.


       DC Digoxin and Lopressor for bradycardia





3. Hypertension, on Cardizem,Hydralazine 100 tid and Isordil .


4. COPD on albuterol.


5. Occasional PVCs.  Echocardiogram shows ejection fraction of 65%.  


6. Rule out COVID pneumonia.


7. Renal failure, creatinine of 2.Improved to 1.6








DW RN





Subjective


Subjective


Remained in atrial fib with milady episodes. Covid is positive . HR was in 50s 

and cardiac meds were held. Troponin mildly elevated but no CP





Objective





Last 24 Hour Vital Signs








  Date Time  Temp Pulse Resp B/P (MAP) Pulse Ox O2 Delivery O2 Flow Rate FiO2


 


5/22/20 08:00 98.2 76 18 149/68 (95) 98   


 


5/22/20 05:27    159/82    


 


5/22/20 05:27    159/82    


 


5/22/20 04:00 98.1 69 18 159/82 (107) 95   


 


5/22/20 04:00  57      


 


5/22/20 01:46    154/90    


 


5/22/20 00:00 98.6 90 18 154/90 (111) 96   


 


5/22/20 00:00      Nasal Cannula 4.0 


 


5/22/20 00:00  82      


 


5/21/20 21:59    171/92    


 


5/21/20 21:53  72  171/92    


 


5/21/20 21:00      Nasal Cannula 4.0 


 


5/21/20 20:00  86      


 


5/21/20 20:00 98.4 84 18 171/92 (118) 98   


 


5/21/20 17:17    139/80    


 


5/21/20 16:00 96.8 70 19 139/80 (99) 97   


 


5/21/20 15:29  60      


 


5/21/20 14:19    140/71    


 


5/21/20 12:08    140/71    


 


5/21/20 12:00 96.5 69 20 140/71 (94) 98   


 


5/21/20 11:25  55      

















Intake and Output  


 


 5/21/20 5/22/20





 19:00 07:00


 


Intake Total 120 ml 


 


Output Total 120 ml 


 


Balance 0 ml 


 


  


 


Intake Oral 120 ml 


 


Output Urine Total 120 ml 











Laboratory Tests








Test


  5/22/20


06:20


 


White Blood Count


  5.8 K/UL


(4.8-10.8)


 


Red Blood Count


  4.07 M/UL


(4.70-6.10)  L


 


Hemoglobin


  13.1 G/DL


(14.2-18.0)  L


 


Hematocrit


  36.9 %


(42.0-52.0)  L


 


Mean Corpuscular Volume 91 FL (80-99)  


 


Mean Corpuscular Hemoglobin


  32.2 PG


(27.0-31.0)  H


 


Mean Corpuscular Hemoglobin


Concent 35.5 G/DL


(32.0-36.0)


 


Red Cell Distribution Width


  11.1 %


(11.6-14.8)  L


 


Platelet Count


  180 K/UL


(150-450)


 


Mean Platelet Volume


  6.2 FL


(6.5-10.1)  L


 


Neutrophils (%) (Auto)


  76.1 %


(45.0-75.0)  H


 


Lymphocytes (%) (Auto)


  11.9 %


(20.0-45.0)  L


 


Monocytes (%) (Auto)


  11.1 %


(1.0-10.0)  H


 


Eosinophils (%) (Auto)


  0.0 %


(0.0-3.0)


 


Basophils (%) (Auto)


  0.8 %


(0.0-2.0)


 


Sodium Level


  142 MMOL/L


(136-145)


 


Potassium Level


  4.0 MMOL/L


(3.5-5.1)


 


Chloride Level


  110 MMOL/L


()  H


 


Carbon Dioxide Level


  20 MMOL/L


(21-32)  L


 


Anion Gap


  12 mmol/L


(5-15)


 


Blood Urea Nitrogen


  40 mg/dL


(7-18)  H


 


Creatinine


  1.6 MG/DL


(0.55-1.30)  H


 


Estimat Glomerular Filtration


Rate 50.7 mL/min


(>60)


 


Glucose Level


  106 MG/DL


()


 


Calcium Level


  8.6 MG/DL


(8.5-10.1)


 


Phosphorus Level


  2.8 MG/DL


(2.5-4.9)


 


Magnesium Level


  2.3 MG/DL


(1.8-2.4)


 


Total Bilirubin


  1.3 MG/DL


(0.2-1.0)  H


 


Direct Bilirubin


  0.5 MG/DL


(0.0-0.3)  H


 


Aspartate Amino Transf


(AST/SGOT) 88 U/L (15-37)


H


 


Alanine Aminotransferase


(ALT/SGPT) 61 U/L (12-78)


 


 


Alkaline Phosphatase


  130 U/L


()  H


 


C-Reactive Protein,


Quantitative 11.5 mg/dL


(0.00-0.90)  H


 


Pro-B-Type Natriuretic Peptide


  8475 pg/mL


(0-125)  H


 


Total Protein


  6.6 G/DL


(6.4-8.2)


 


Albumin


  2.1 G/DL


(3.4-5.0)  L


 


Globulin 4.5 g/dL  


 


Albumin/Globulin Ratio


  0.5 (1.0-2.7)


L








Objective





HEAD AND NECK:  Showed no JVD.


LUNGS:  Coarse rhonchi.


CARDIOVASCULAR:  Irregular S1 and S2 with no gallop.


ABDOMEN:  Soft.


EXTREMITIES:  No pitting edema.











Jake Lan MD May 22, 2020 09:14

## 2020-05-22 NOTE — INTERNAL MED PROGRESS NOTE
Subjective


Physician Name


Urban Brown


Attending Physician


Urban Brown MD





Current Medications








 Medications


  (Trade)  Dose


 Ordered  Sig/Michael


 Route


 PRN Reason  Start Time


 Stop Time Status Last Admin


Dose Admin


 


 Acetaminophen


  (Tylenol)  650 mg  Q6H  PRN


 ORAL


 mild pain/fever  5/17/20 08:48


 6/16/20 08:47   


 


 


 Albuterol Sulfate


  (Proventil MDI)  2 puff  Q4H  PRN


 INH


 Shortness of Breath  5/17/20 07:30


 8/15/20 07:29   


 


 


 Apixaban


  (Eliquis)  2.5 mg  BID


 ORAL


   5/18/20 18:00


 8/16/20 17:59  5/22/20 09:22


 


 


 Artificial Tears


  (Akwa-Tears)  1 drop  BID


 BOTH EYES


   5/17/20 09:00


 6/16/20 08:59  5/22/20 09:20


 


 


 Aspirin


  (Ecotrin)  81 mg  DAILY


 ORAL


   5/17/20 09:00


 7/1/20 08:59  5/22/20 09:21


 


 


 Atorvastatin


 Calcium


  (Lipitor)  20 mg  BEDTIME


 ORAL


   5/17/20 21:00


 8/15/20 20:59  5/21/20 21:52


 


 


 Bisacodyl


  (Dulcolax)  10 mg  PRN


 RECTAL


   5/17/20 00:15


 8/15/20 00:14   


 


 


 Cefepime HCl 1 gm/


 Dextrose  50 ml @ 


 100 mls/hr  EVERY 12  HOURS


 IVPB


   5/17/20 09:00


 5/24/20 08:59  5/22/20 09:21


 


 


 Dextrose


  (Dextrose 50%)  25 ml  Q30M  PRN


 IV


 Hypoglycemia  5/17/20 00:15


 8/15/20 00:14   


 


 


 Dextrose


  (Dextrose 50%)  50 ml  Q30M  PRN


 IV


 Hypoglycemia  5/17/20 00:15


 8/15/20 00:14   


 


 


 Diltiazem HCl


  (Cardizem CD)  180 mg  DAILY


 ORAL


   5/17/20 09:00


 6/16/20 08:59  5/22/20 09:21


 


 


 Docusate Sodium


  (Colace)  250 mg  TWICE A  DAY


 ORAL


   5/17/20 09:00


 6/16/20 08:59  5/22/20 09:21


 


 


 Hydralazine HCl


  (Apresoline)  100 mg  EVERY 8  HOURS


 ORAL


   5/19/20 22:00


 8/15/20 05:59  5/22/20 14:28


 


 


 Isosorbide


 Dinitrate


  (Isordil)  10 mg  Q6HR


 ORAL


   5/17/20 06:00


 6/16/20 05:59  5/22/20 12:46


 


 


 Mineral Oil


  (Fleet's Mineral


 Oil Enema)  133 ml  DAILYPRN  PRN


 RECTAL


 CONSTIPATION  5/17/20 05:58


 6/16/20 05:57   


 


 


 Minoxidil


  (Loniten)  2.5 mg  Q4H  PRN


 ORAL


 BP over 170 syst and 100 diast  5/19/20 14:10


 8/17/20 14:09   


 


 


 Multivitamins


 Therapeutic


  (Therapeutic


 Multivitamin)  1 ea  DAILY


 ORAL


   5/17/20 09:00


 6/16/20 08:59  5/22/20 09:22


 


 


 Potassium Chloride


  (K-Dur)  40 meq  TWICE A  DAY


 ORAL


   5/20/20 09:00


 8/18/20 08:59  5/22/20 09:22


 


 


 Sorbitol


  (sorbitoL)  30 ml  EVERY 6  HOURS


 ORAL


   5/17/20 12:00


 6/16/20 11:59  5/22/20 12:47


 


 


 Tamsulosin HCl


  (Flomax)  0.4 mg  BID


 ORAL


   5/19/20 18:00


 6/18/20 17:59  5/22/20 09:22


 








Allergies:  


Coded Allergies:  


     No Known Allergies (Unverified , 5/16/20)


Subjective


Awake, alert, responsive, denies any chest pain, decreased shortness of breath.

  In [place] [id number] isolation room





Objective





Last Vital Signs








  Date Time  Temp Pulse Resp B/P (MAP) Pulse Ox O2 Delivery O2 Flow Rate FiO2


 


5/22/20 14:28    140/68    


 


5/22/20 12:00 98.8 60 19  96   


 


5/22/20 09:00      Nasal Cannula 4.0 











Laboratory Tests








Test


  5/22/20


06:20


 


White Blood Count


  5.8 K/UL


(4.8-10.8)


 


Red Blood Count


  4.07 M/UL


(4.70-6.10)  L


 


Hemoglobin


  13.1 G/DL


(14.2-18.0)  L


 


Hematocrit


  36.9 %


(42.0-52.0)  L


 


Mean Corpuscular Volume 91 FL (80-99)  


 


Mean Corpuscular Hemoglobin


  32.2 PG


(27.0-31.0)  H


 


Mean Corpuscular Hemoglobin


Concent 35.5 G/DL


(32.0-36.0)


 


Red Cell Distribution Width


  11.1 %


(11.6-14.8)  L


 


Platelet Count


  180 K/UL


(150-450)


 


Mean Platelet Volume


  6.2 FL


(6.5-10.1)  L


 


Neutrophils (%) (Auto)


  76.1 %


(45.0-75.0)  H


 


Lymphocytes (%) (Auto)


  11.9 %


(20.0-45.0)  L


 


Monocytes (%) (Auto)


  11.1 %


(1.0-10.0)  H


 


Eosinophils (%) (Auto)


  0.0 %


(0.0-3.0)


 


Basophils (%) (Auto)


  0.8 %


(0.0-2.0)


 


Sodium Level


  142 MMOL/L


(136-145)


 


Potassium Level


  4.0 MMOL/L


(3.5-5.1)


 


Chloride Level


  110 MMOL/L


()  H


 


Carbon Dioxide Level


  20 MMOL/L


(21-32)  L


 


Anion Gap


  12 mmol/L


(5-15)


 


Blood Urea Nitrogen


  40 mg/dL


(7-18)  H


 


Creatinine


  1.6 MG/DL


(0.55-1.30)  H


 


Estimat Glomerular Filtration


Rate 50.7 mL/min


(>60)


 


Glucose Level


  106 MG/DL


()


 


Calcium Level


  8.6 MG/DL


(8.5-10.1)


 


Phosphorus Level


  2.8 MG/DL


(2.5-4.9)


 


Magnesium Level


  2.3 MG/DL


(1.8-2.4)


 


Total Bilirubin


  1.3 MG/DL


(0.2-1.0)  H


 


Direct Bilirubin


  0.5 MG/DL


(0.0-0.3)  H


 


Aspartate Amino Transf


(AST/SGOT) 88 U/L (15-37)


H


 


Alanine Aminotransferase


(ALT/SGPT) 61 U/L (12-78)


 


 


Alkaline Phosphatase


  130 U/L


()  H


 


C-Reactive Protein,


Quantitative 11.5 mg/dL


(0.00-0.90)  H


 


Pro-B-Type Natriuretic Peptide


  8475 pg/mL


(0-125)  H


 


Total Protein


  6.6 G/DL


(6.4-8.2)


 


Albumin


  2.1 G/DL


(3.4-5.0)  L


 


Globulin 4.5 g/dL  


 


Albumin/Globulin Ratio


  0.5 (1.0-2.7)


L

















Intake and Output  


 


 5/21/20 5/22/20





 19:00 07:00


 


Intake Total 120 ml 


 


Output Total 120 ml 


 


Balance 0 ml 


 


  


 


Intake Oral 120 ml 


 


Output Urine Total 120 ml 








Objective


GENERAL:  The patient awake, responsive, no acute distress.


HEENT:  Pupils are equal and reactive to light.  Extraocular movements


intact.  Neck was supple.  No JVD.


LUNGS:  Good air entry.  No wheezing or rales. Decreased in bases.


HEART:  S1 and S2.  Distant heart sounds.  No murmur or gallops.


ABDOMEN:  Soft, nondistended, nontender. Positive bowel sounds.


EXTREMITIES:  No cyanosis, clubbing, edema.


NEUROLOGIC:  Cranial nerves II through XII grossly normal.  The patient


moving left side upper extremities and lower extremity.  Right upper


extremity contracture.  Right lower extremity is decreased motor.





Assessment/Plan


Assessment/Plan





ASSESSMENT:


1. Right lower lobe pneumonia.


2. Acute hypoxemic respiratory failure.


3. COVID-19 Pneumonia


4. Acute kidney injury on chronic renal insufficiency.


5. Acute Citrobacter UTI.


6. Hypertension.


7. Dyslipidemia.


8. History of CVA with right hemiparesis.





Plan:


in COVID-19 isolation.  


We will follow up with urine culture and blood culture.


Monitor laboratory, 


Abx: cefepime.


DVT prophylaxis: Eliquis


Code status is DNR.


Dr. Lan from Cardiology Electrophysiology,


Dr. Barcenas, Pulmonary Critical Care, 


Dr. Mello from Nephrology


Dr. Gentile from Infectious Diseases consultations.


Transfer to [facility] soon.











Urban Brown MD May 22, 2020 15:38

## 2020-05-22 NOTE — NEPHROLOGY PROGRESS NOTE
Assessment/Plan


Problem List:  


(1) Renal failure (ARF), acute on chronic


(2) NSTEMI (non-ST elevated myocardial infarction)


(3) Right lower lobe pneumonia


(4) Suspected COVID-19 virus infection


(5) HTN (hypertension)


(6) UTI (urinary tract infection)


Assessment





Acute renal failure most likely superimposed on underlying chronic kidney 

disease


Acute hypoxic respiratory failure, pneumonia, suspected COVID-19


Sepsis


Elevated troponin


Suspected congestive heart failure


Hypertension, hypertension urgency on admission


Evidence of UTI


Plan








Blood pressure now controlled


Watch slow heart rate.


Adjust blood pressure medication as needed


Monitor renal parameters


Potassium, phosphorus, magnesium supplement as needed





Previously:


Slow hydration


Watch for CHF symptoms


Pulmonary toilet


Antibiotics


Urine studies


Keep the blood pressure and blood sugar in check


Monitor renal parameters


Per orders


Per consultants





Subjective


ROS Limited/Unobtainable:  No


Constitutional:  Reports: malaise, weakness





Objective


Objective





Last 24 Hour Vital Signs








  Date Time  Temp Pulse Resp B/P (MAP) Pulse Ox O2 Delivery O2 Flow Rate FiO2


 


5/22/20 12:46    140/68    


 


5/22/20 12:00 98.8 60 19 140/68 (92) 96   


 


5/22/20 09:21  76  149/68    


 


5/22/20 09:00      Nasal Cannula 4.0 


 


5/22/20 08:00  51      


 


5/22/20 08:00 98.2 76 18 149/68 (95) 98   


 


5/22/20 05:27    159/82    


 


5/22/20 05:27    159/82    


 


5/22/20 04:00 98.1 69 18 159/82 (107) 95   


 


5/22/20 04:00  57      


 


5/22/20 01:46    154/90    


 


5/22/20 00:00 98.6 90 18 154/90 (111) 96   


 


5/22/20 00:00      Nasal Cannula 4.0 


 


5/22/20 00:00  82      


 


5/21/20 21:59    171/92    


 


5/21/20 21:53  72  171/92    


 


5/21/20 21:00      Nasal Cannula 4.0 


 


5/21/20 20:00  86      


 


5/21/20 20:00 98.4 84 18 171/92 (118) 98   


 


5/21/20 17:17    139/80    


 


5/21/20 16:00 96.8 70 19 139/80 (99) 97   


 


5/21/20 15:29  60      


 


5/21/20 14:19    140/71    

















Intake and Output  


 


 5/21/20 5/22/20





 19:00 07:00


 


Intake Total 120 ml 


 


Output Total 120 ml 


 


Balance 0 ml 


 


  


 


Intake Oral 120 ml 


 


Output Urine Total 120 ml 








Laboratory Tests


5/22/20 06:20: 


White Blood Count 5.8, Red Blood Count 4.07L, Hemoglobin 13.1L, Hematocrit 36.9L

, Mean Corpuscular Volume 91, Mean Corpuscular Hemoglobin 32.2H, Mean 

Corpuscular Hemoglobin Concent 35.5, Red Cell Distribution Width 11.1L, 

Platelet Count 180, Mean Platelet Volume 6.2L, Neutrophils (%) (Auto) 76.1H, 

Lymphocytes (%) (Auto) 11.9L, Monocytes (%) (Auto) 11.1H, Eosinophils (%) (Auto

) 0.0, Basophils (%) (Auto) 0.8, Sodium Level 142, Potassium Level 4.0, 

Chloride Level 110H, Carbon Dioxide Level 20L, Anion Gap 12, Blood Urea 

Nitrogen 40H, Creatinine 1.6H, Estimat Glomerular Filtration Rate 50.7, Glucose 

Level 106, Calcium Level 8.6, Phosphorus Level 2.8, Magnesium Level 2.3, Total 

Bilirubin 1.3H, Direct Bilirubin 0.5H, Aspartate Amino Transf (AST/SGOT) 88H, 

Alanine Aminotransferase (ALT/SGPT) 61, Alkaline Phosphatase 130H, C-Reactive 

Protein, Quantitative 11.5H, Pro-B-Type Natriuretic Peptide 8475H, Total 

Protein 6.6, Albumin 2.1L, Globulin 4.5, Albumin/Globulin Ratio 0.5L


Height (Feet):  5


Height (Inches):  9.00


Weight (Pounds):  216


General Appearance:  no apparent distress, lethargic


Cardiovascular:  normal rate


Respiratory/Chest:  decreased breath sounds


Abdomen:  distended


Objective


No change











Shane Mello MD May 22, 2020 13:35

## 2020-05-22 NOTE — PULMONOLOGY PROGRESS NOTE
Subjective


ROS Limited/Unobtainable:  No


Constitutional:  Reports: no symptoms


HEENT:  Repors: no symptoms


Cardiovascular:  Reports: no symptoms


Allergies:  


Coded Allergies:  


     No Known Allergies (Unverified , 5/16/20)


All Systems:  reviewed and negative except above





Objective





Last 24 Hour Vital Signs








  Date Time  Temp Pulse Resp B/P (MAP) Pulse Ox O2 Delivery O2 Flow Rate FiO2


 


5/22/20 09:21  76  149/68    


 


5/22/20 09:00      Nasal Cannula 4.0 


 


5/22/20 08:00  51      


 


5/22/20 08:00 98.2 76 18 149/68 (95) 98   


 


5/22/20 05:27    159/82    


 


5/22/20 05:27    159/82    


 


5/22/20 04:00 98.1 69 18 159/82 (107) 95   


 


5/22/20 04:00  57      


 


5/22/20 01:46    154/90    


 


5/22/20 00:00 98.6 90 18 154/90 (111) 96   


 


5/22/20 00:00      Nasal Cannula 4.0 


 


5/22/20 00:00  82      


 


5/21/20 21:59    171/92    


 


5/21/20 21:53  72  171/92    


 


5/21/20 21:00      Nasal Cannula 4.0 


 


5/21/20 20:00  86      


 


5/21/20 20:00 98.4 84 18 171/92 (118) 98   


 


5/21/20 17:17    139/80    


 


5/21/20 16:00 96.8 70 19 139/80 (99) 97   


 


5/21/20 15:29  60      


 


5/21/20 14:19    140/71    


 


5/21/20 12:08    140/71    


 


5/21/20 12:00 96.5 69 20 140/71 (94) 98   

















Intake and Output  


 


 5/21/20 5/22/20





 19:00 07:00


 


Intake Total 120 ml 


 


Output Total 120 ml 


 


Balance 0 ml 


 


  


 


Intake Oral 120 ml 


 


Output Urine Total 120 ml 








General Appearance:  WD/WN


HEENT:  normocephalic


Respiratory:  chest wall non-tender, rhonchi - left, rhonchi - right


Cardiovascular:  normal peripheral pulses, normal rate, regular rhythm


Abdomen:  normal bowel sounds, soft, non tender


Genitourinary:  normal external genitalia


Extremities:  no cyanosis


Skin:  no rash


Neurologic:  CNs II-XII grossly normal, no motor/sensory deficits


Laboratory Tests


5/22/20 06:20: 


White Blood Count 5.8, Red Blood Count 4.07L, Hemoglobin 13.1L, Hematocrit 36.9L

, Mean Corpuscular Volume 91, Mean Corpuscular Hemoglobin 32.2H, Mean 

Corpuscular Hemoglobin Concent 35.5, Red Cell Distribution Width 11.1L, 

Platelet Count 180, Mean Platelet Volume 6.2L, Neutrophils (%) (Auto) 76.1H, 

Lymphocytes (%) (Auto) 11.9L, Monocytes (%) (Auto) 11.1H, Eosinophils (%) (Auto

) 0.0, Basophils (%) (Auto) 0.8, Sodium Level 142, Potassium Level 4.0, 

Chloride Level 110H, Carbon Dioxide Level 20L, Anion Gap 12, Blood Urea 

Nitrogen 40H, Creatinine 1.6H, Estimat Glomerular Filtration Rate 50.7, Glucose 

Level 106, Calcium Level 8.6, Phosphorus Level 2.8, Magnesium Level 2.3, Total 

Bilirubin 1.3H, Direct Bilirubin 0.5H, Aspartate Amino Transf (AST/SGOT) 88H, 

Alanine Aminotransferase (ALT/SGPT) 61, Alkaline Phosphatase 130H, C-Reactive 

Protein, Quantitative 11.5H, Pro-B-Type Natriuretic Peptide 8475H, Total 

Protein 6.6, Albumin 2.1L, Globulin 4.5, Albumin/Globulin Ratio 0.5L





Current Medications








 Medications


  (Trade)  Dose


 Ordered  Sig/Michael


 Route


 PRN Reason  Start Time


 Stop Time Status Last Admin


Dose Admin


 


 Acetaminophen


  (Tylenol)  650 mg  Q6H  PRN


 ORAL


 mild pain/fever  5/17/20 08:48


 6/16/20 08:47   


 


 


 Albuterol Sulfate


  (Proventil MDI)  2 puff  Q4H  PRN


 INH


 Shortness of Breath  5/17/20 07:30


 8/15/20 07:29   


 


 


 Apixaban


  (Eliquis)  2.5 mg  BID


 ORAL


   5/18/20 18:00


 8/16/20 17:59  5/22/20 09:22


 


 


 Artificial Tears


  (Akwa-Tears)  1 drop  BID


 BOTH EYES


   5/17/20 09:00


 6/16/20 08:59  5/22/20 09:20


 


 


 Aspirin


  (Ecotrin)  81 mg  DAILY


 ORAL


   5/17/20 09:00


 7/1/20 08:59  5/22/20 09:21


 


 


 Atorvastatin


 Calcium


  (Lipitor)  20 mg  BEDTIME


 ORAL


   5/17/20 21:00


 8/15/20 20:59  5/21/20 21:52


 


 


 Bisacodyl


  (Dulcolax)  10 mg  PRN


 RECTAL


   5/17/20 00:15


 8/15/20 00:14   


 


 


 Cefepime HCl 1 gm/


 Dextrose  50 ml @ 


 100 mls/hr  EVERY 12  HOURS


 IVPB


   5/17/20 09:00


 5/24/20 08:59  5/22/20 09:21


 


 


 Dextrose


  (Dextrose 50%)  25 ml  Q30M  PRN


 IV


 Hypoglycemia  5/17/20 00:15


 8/15/20 00:14   


 


 


 Dextrose


  (Dextrose 50%)  50 ml  Q30M  PRN


 IV


 Hypoglycemia  5/17/20 00:15


 8/15/20 00:14   


 


 


 Diltiazem HCl


  (Cardizem CD)  180 mg  DAILY


 ORAL


   5/17/20 09:00


 6/16/20 08:59  5/22/20 09:21


 


 


 Docusate Sodium


  (Colace)  250 mg  TWICE A  DAY


 ORAL


   5/17/20 09:00


 6/16/20 08:59  5/22/20 09:21


 


 


 Hydralazine HCl


  (Apresoline)  100 mg  EVERY 8  HOURS


 ORAL


   5/19/20 22:00


 8/15/20 05:59  5/22/20 05:27


 


 


 Isosorbide


 Dinitrate


  (Isordil)  10 mg  Q6HR


 ORAL


   5/17/20 06:00


 6/16/20 05:59  5/22/20 05:27


 


 


 Mineral Oil


  (Fleet's Mineral


 Oil Enema)  133 ml  DAILYPRN  PRN


 RECTAL


 CONSTIPATION  5/17/20 05:58


 6/16/20 05:57   


 


 


 Minoxidil


  (Loniten)  2.5 mg  Q4H  PRN


 ORAL


 BP over 170 syst and 100 diast  5/19/20 14:10


 8/17/20 14:09   


 


 


 Multivitamins


 Therapeutic


  (Therapeutic


 Multivitamin)  1 ea  DAILY


 ORAL


   5/17/20 09:00


 6/16/20 08:59  5/22/20 09:22


 


 


 Potassium Chloride


  (K-Dur)  40 meq  TWICE A  DAY


 ORAL


   5/20/20 09:00


 8/18/20 08:59  5/22/20 09:22


 


 


 Sorbitol


  (sorbitoL)  30 ml  EVERY 6  HOURS


 ORAL


   5/17/20 12:00


 6/16/20 11:59  5/22/20 02:30


 


 


 Tamsulosin HCl


  (Flomax)  0.4 mg  BID


 ORAL


   5/19/20 18:00


 6/18/20 17:59  5/22/20 09:22


 











Assessment/Plan


Problems:  


(1) 2019 novel coronavirus disease (COVID-19)


(2) Right lower lobe pneumonia


(3) UTI (urinary tract infection)


(4) Moderate pulmonary arterial systolic hypertension


(5) Renal failure (ARF), acute on chronic


(6) Chronic atrial fibrillation


(7) History of CVA (cerebrovascular accident)


(8) HTN (hypertension)


Assessment/Plan


stable


all reviewed


afebrile > 48 hours


COVID positive


EF noted, 65%


vital signs stable


bun/creatinine improving


check cultures, Urine Citrobacter


iv abx


monitor BP and heart rate.











Doe Barcenas MD May 22, 2020 11:43

## 2020-05-22 NOTE — INFECTIOUS DISEASES PROG NOTE
Assessment/Plan


Assessment/Plan








Assessment:


Severe Sepsis 


Pneumonia- 2ry to COVID19


Acute hypoxic respiratory failure on 4l NC


   -5/16 CXR:  Right lower lung pneumonia. Cardiomegaly.


   -SARS COV2 PCR +


   


Probable UTI


  -u/a wbc 30-40, nit neg, leuk +1; ucx >100k C. koseri (pan S)


  Bcx NTD





Fever; SP


No leukocytosis> mild leukopenia


Thrombocytopenia





LIZA, improving





Elevated AST; improving





Accelerated hypertension





NSTEMI





HTN


COPD


 CVA w/ residual R side weakness


 HLD


dysphagia


pneumonia


 DNR/DNI status


SNF resident ([facility])





Plan:


-COntinue CEfepime #6 (abx d #7/7) for UTI


     -5/20 SP Azithromycin #5


     -5/16 SP Ceftriaxone x1, IV Vancomycin x1





-f/u cx


-Monitor CBC/CMP, temperatures


-f/u sp cx, legionella ag urine


-COVID19 isolation 


-aspiration precautions


-Renal, pulm and cards f/u





Thank you for consulting [facility]. Will continue to follow along with 

you.





Discussed with RN,





Subjective


Allergies:  


Coded Allergies:  


     No Known Allergies (Unverified , 5/16/20)


Subjective


afebrile


at 4l NC


Bcx NTD


Cr improving





Objective


Vital Signs





Last 24 Hour Vital Signs








  Date Time  Temp Pulse Resp B/P (MAP) Pulse Ox O2 Delivery O2 Flow Rate FiO2


 


5/22/20 09:21  76  149/68    


 


5/22/20 09:00      Nasal Cannula 4.0 


 


5/22/20 08:00  51      


 


5/22/20 08:00 98.2 76 18 149/68 (95) 98   


 


5/22/20 05:27    159/82    


 


5/22/20 05:27    159/82    


 


5/22/20 04:00 98.1 69 18 159/82 (107) 95   


 


5/22/20 04:00  57      


 


5/22/20 01:46    154/90    


 


5/22/20 00:00 98.6 90 18 154/90 (111) 96   


 


5/22/20 00:00      Nasal Cannula 4.0 


 


5/22/20 00:00  82      


 


5/21/20 21:59    171/92    


 


5/21/20 21:53  72  171/92    


 


5/21/20 21:00      Nasal Cannula 4.0 


 


5/21/20 20:00  86      


 


5/21/20 20:00 98.4 84 18 171/92 (118) 98   


 


5/21/20 17:17    139/80    


 


5/21/20 16:00 96.8 70 19 139/80 (99) 97   


 


5/21/20 15:29  60      


 


5/21/20 14:19    140/71    








Height (Feet):  5


Height (Inches):  9.00


Weight (Pounds):  216


Objective


Gen: no respiratory distress


Head: normocephalic


Lungs: no tachypnea or use of accessory resp muscles


Abd: not distended


HEENT: NC in place





Laboratory Tests








Test


  5/22/20


06:20


 


White Blood Count


  5.8 K/UL


(4.8-10.8)


 


Red Blood Count


  4.07 M/UL


(4.70-6.10)  L


 


Hemoglobin


  13.1 G/DL


(14.2-18.0)  L


 


Hematocrit


  36.9 %


(42.0-52.0)  L


 


Mean Corpuscular Volume 91 FL (80-99)  


 


Mean Corpuscular Hemoglobin


  32.2 PG


(27.0-31.0)  H


 


Mean Corpuscular Hemoglobin


Concent 35.5 G/DL


(32.0-36.0)


 


Red Cell Distribution Width


  11.1 %


(11.6-14.8)  L


 


Platelet Count


  180 K/UL


(150-450)


 


Mean Platelet Volume


  6.2 FL


(6.5-10.1)  L


 


Neutrophils (%) (Auto)


  76.1 %


(45.0-75.0)  H


 


Lymphocytes (%) (Auto)


  11.9 %


(20.0-45.0)  L


 


Monocytes (%) (Auto)


  11.1 %


(1.0-10.0)  H


 


Eosinophils (%) (Auto)


  0.0 %


(0.0-3.0)


 


Basophils (%) (Auto)


  0.8 %


(0.0-2.0)


 


Sodium Level


  142 MMOL/L


(136-145)


 


Potassium Level


  4.0 MMOL/L


(3.5-5.1)


 


Chloride Level


  110 MMOL/L


()  H


 


Carbon Dioxide Level


  20 MMOL/L


(21-32)  L


 


Anion Gap


  12 mmol/L


(5-15)


 


Blood Urea Nitrogen


  40 mg/dL


(7-18)  H


 


Creatinine


  1.6 MG/DL


(0.55-1.30)  H


 


Estimat Glomerular Filtration


Rate 50.7 mL/min


(>60)


 


Glucose Level


  106 MG/DL


()


 


Calcium Level


  8.6 MG/DL


(8.5-10.1)


 


Phosphorus Level


  2.8 MG/DL


(2.5-4.9)


 


Magnesium Level


  2.3 MG/DL


(1.8-2.4)


 


Total Bilirubin


  1.3 MG/DL


(0.2-1.0)  H


 


Direct Bilirubin


  0.5 MG/DL


(0.0-0.3)  H


 


Aspartate Amino Transf


(AST/SGOT) 88 U/L (15-37)


H


 


Alanine Aminotransferase


(ALT/SGPT) 61 U/L (12-78)


 


 


Alkaline Phosphatase


  130 U/L


()  H


 


C-Reactive Protein,


Quantitative 11.5 mg/dL


(0.00-0.90)  H


 


Pro-B-Type Natriuretic Peptide


  8475 pg/mL


(0-125)  H


 


Total Protein


  6.6 G/DL


(6.4-8.2)


 


Albumin


  2.1 G/DL


(3.4-5.0)  L


 


Globulin 4.5 g/dL  


 


Albumin/Globulin Ratio


  0.5 (1.0-2.7)


L











Current Medications








 Medications


  (Trade)  Dose


 Ordered  Sig/Michael


 Route


 PRN Reason  Start Time


 Stop Time Status Last Admin


Dose Admin


 


 Acetaminophen


  (Tylenol)  650 mg  Q6H  PRN


 ORAL


 mild pain/fever  5/17/20 08:48


 6/16/20 08:47   


 


 


 Albuterol Sulfate


  (Proventil MDI)  2 puff  Q4H  PRN


 INH


 Shortness of Breath  5/17/20 07:30


 8/15/20 07:29   


 


 


 Apixaban


  (Eliquis)  2.5 mg  BID


 ORAL


   5/18/20 18:00


 8/16/20 17:59  5/22/20 09:22


 


 


 Artificial Tears


  (Akwa-Tears)  1 drop  BID


 BOTH EYES


   5/17/20 09:00


 6/16/20 08:59  5/22/20 09:20


 


 


 Aspirin


  (Ecotrin)  81 mg  DAILY


 ORAL


   5/17/20 09:00


 7/1/20 08:59  5/22/20 09:21


 


 


 Atorvastatin


 Calcium


  (Lipitor)  20 mg  BEDTIME


 ORAL


   5/17/20 21:00


 8/15/20 20:59  5/21/20 21:52


 


 


 Bisacodyl


  (Dulcolax)  10 mg  PRN


 RECTAL


   5/17/20 00:15


 8/15/20 00:14   


 


 


 Cefepime HCl 1 gm/


 Dextrose  50 ml @ 


 100 mls/hr  EVERY 12  HOURS


 IVPB


   5/17/20 09:00


 5/24/20 08:59  5/22/20 09:21


 


 


 Dextrose


  (Dextrose 50%)  25 ml  Q30M  PRN


 IV


 Hypoglycemia  5/17/20 00:15


 8/15/20 00:14   


 


 


 Dextrose


  (Dextrose 50%)  50 ml  Q30M  PRN


 IV


 Hypoglycemia  5/17/20 00:15


 8/15/20 00:14   


 


 


 Diltiazem HCl


  (Cardizem CD)  180 mg  DAILY


 ORAL


   5/17/20 09:00


 6/16/20 08:59  5/22/20 09:21


 


 


 Docusate Sodium


  (Colace)  250 mg  TWICE A  DAY


 ORAL


   5/17/20 09:00


 6/16/20 08:59  5/22/20 09:21


 


 


 Hydralazine HCl


  (Apresoline)  100 mg  EVERY 8  HOURS


 ORAL


   5/19/20 22:00


 8/15/20 05:59  5/22/20 05:27


 


 


 Isosorbide


 Dinitrate


  (Isordil)  10 mg  Q6HR


 ORAL


   5/17/20 06:00


 6/16/20 05:59  5/22/20 05:27


 


 


 Mineral Oil


  (Fleet's Mineral


 Oil Enema)  133 ml  DAILYPRN  PRN


 RECTAL


 CONSTIPATION  5/17/20 05:58


 6/16/20 05:57   


 


 


 Minoxidil


  (Loniten)  2.5 mg  Q4H  PRN


 ORAL


 BP over 170 syst and 100 diast  5/19/20 14:10


 8/17/20 14:09   


 


 


 Multivitamins


 Therapeutic


  (Therapeutic


 Multivitamin)  1 ea  DAILY


 ORAL


   5/17/20 09:00


 6/16/20 08:59  5/22/20 09:22


 


 


 Potassium Chloride


  (K-Dur)  40 meq  TWICE A  DAY


 ORAL


   5/20/20 09:00


 8/18/20 08:59  5/22/20 09:22


 


 


 Sorbitol


  (sorbitoL)  30 ml  EVERY 6  HOURS


 ORAL


   5/17/20 12:00


 6/16/20 11:59  5/22/20 02:30


 


 


 Tamsulosin HCl


  (Flomax)  0.4 mg  BID


 ORAL


   5/19/20 18:00


 6/18/20 17:59  5/22/20 09:22


 

















Tatum Mills M.D. May 22, 2020 12:10

## 2020-05-22 NOTE — NUR
NURSE NOTES:

Left message for Dr Mills informing of pt urine - has citrobacter koseri. Informed day 
nurse Regla as well.

## 2020-05-22 NOTE — NUR
NURSE NOTES:

Patient discharged to [facility]. Report given to [place]. Patient stable. No belongings 
with patient. IV flushed and patent to RT AC 20g. Discharge packet given to ambulance 
personnel. Next of kin called and notified of discharge. Patient taken off cardiac monitor.

## 2020-05-22 NOTE — NUR
RD ASSESSMENT & RECOMMENDATIONS

SEE CARE ACTIVITY FOR COMPLETE ASSESSMENT



DAILY ESTIMATED NEEDS:

Needs based on Cardiac, pulmonary, wounds 79kg abw 

25-30  kcals/kg 

1975-2370  total kcals

1.25-1.5  g protein/kg

  g total protein 

25-30  mL/kg

1975-2370  total fluid mLs



NUTRITION DIAGNOSIS:

Increased pro needs r/t wound healing as evidenced by non-blanching

erythema sacrum,R and L gluteal cheeks.Darker skin tone without erythema

or induration R ischium.Non-blanching erythema L Heel.



CURRENT DIET: Low Na     



PO DIET RECOMMENDATIONS:

rec to liberalize diet to Regular d/t poor po intake 



ADDITIONAL RECOMMENDATIONS:

1) SLP eval for appropriate texture d/t h/o CVA 

2) Add Ensure Enlive TID to meals  

3) If tolerated: add GURDEEP BID  

4) Maintain calibrated bed scale wts

## 2020-05-22 NOTE — NUR
NOTE



CALL MADE TO [facility] @ [phone]. S/W ORTEGA AND INFORMED PATIENT IS 
STABLE FOR TRANSFER PER ORDER BY DR MACIAS. PER ORTEGA AT [place], SHE WILL INFORM [facility] AND WILL CALL UNIT FOR PATIENT STATUS UPON RECEIPT OF CLINICALS. CURRENT CLINICALS 
FAXED [phone].

## 2020-05-22 NOTE — NUR
NURSE NOTES:

Received report from Merlyn Vance RN. Patient stable, resting in bed with no s/sx of 
distress. RR even and unlabored on RA. Side rails upx2, call light within reach, bed low and 
locked. Will continue to monitor.

## 2020-05-22 NOTE — NUR
*-* INSURANCE *-*



UPDATED CLINICALS AND REVIEW HAVE BEEN FAXED TO:



[employer] ([place]) 

[phone]   Work

[phone]   Fax

[phone]   FAX

[phone]   FAX

## 2020-05-22 NOTE — NUR
NOTE



CALL MADE TO PATIENTS SISTER LEXX ALBERTS AND INFORMED OF ANTICIPATED TRANSFER TO [place] 
PER [facility]. SISTER IN AGREEMENT OF TRANSFER. 



WILL UPDATE SISTER WHEN ADDITIONAL INFO IS AVAILABLE

## 2020-05-24 NOTE — DISCHARGE SUMMARY
Discharge Summary


Discharge Summary


_


DATE OF ADMISSION: 05/16/2020


DATE OF DISCHARGE: 05/22/2020





ATTENDING MD: Dr. Urban Brown





CONSULTANTS: 


Dr. Shane Weller





BRIEF HOSPITAL COURSE:


Patient is an 80-year-old -American gentleman with past medical history 

significant for pneumonia, COPD, hypertension, dyslipidemia, prior history of 

stroke with right-sided hemiparesis, presented to the emergency department from 

nursing facility after he was noted to have shortness of breath.  The patient 

had cough and chills.  No fever was reported.  After initial evaluation at the 

emergency department, patient was admitted due to pneumonia, possible COVID19 

infection, as well as COPD.





During evaluation at the emergency department, patient was febrile with T-max 

104.  Blood work showed WBC of 6.3, hemoglobin 13, hematocrit 42, platelet 124.

  Sodium 146, potassium 3.9, chloride 108, bicarbonate 25, BUN 48, creatinine 

2.0, calcium 8.6, GFR 39, glucose 100.  Total bilirubin 1.9, direct bilirubin 

1.1, AST of 60, ALT of 50.  Troponin was 0.151.  proBNP of 4086.  Albumin 2.8.  

Lipase 256.  Digoxin 1.7.  Urinalysis showed +3 protein, +3 blood, +2 leukocytes

, 40-60 RBCs, 30-40 WBC and many bacteria.  D-dimer was 2.57. Ferritin 1,398. 

CRP 27.8. Chest x-ray showed right lower lobe pneumonia and cardiomegaly.  

Patient was pancultured.  SARS-CoV-2 PCR sent.  Patient was placed on 

respiratory isolation.  Patient was subsequently admitted to telemetry floor.





He was given slow IV hydration.  He was placed on O2 support.  He was given MDI 

Proventil as needed.  He was continued on nursing home medications.  He was 

placed on low-dose subcutaneous Lovenox for DVT prophylaxis.  Kidney function 

was monitored.  He was given acetaminophen as needed for fever.





He presented with elevated troponin.  EKG showed atrial fibrillation with 

occasional PVCs.  He was given aspirin and Lipitor.  Metoprolol was added to 

his medical regimen.  He was continued on Imdur.  Echocardiogram showed 

ejection fraction of 65%.  Cardiac enzymes were monitored.





Patient had elevated troponin level, possibly non-ST elevated MI, however, 

could also be due to renal failure since creatinine is 2.  Atrial fibrillation 

was controlled in Cardizem  mg daily and digoxin.  Lovenox was eventually 

discontinued and patient was started on Eliquis 5 mg twice daily.





He was given empiric antibiotics IV vancomycin, cefepime, and azithromycin.  He 

eventually defervesced.  Patient has severe sepsis and pneumonia with high 

suspicion for COVID-19.  He required high flow O2 at 4 L nasal cannula.





Kidney function was improving.  Patient had acute renal failure most likely 

superimposed on underlying chronic kidney disease.  Blood pressure was elevated 

and antihypertensives were titrated.  Hydralazine was increased.  He was placed 

on minoxidil prn.





SARS-CoV-2 PCR was positive.  Urine culture showed growth of Citrobacter.  

Blood culture did not isolate any growth.





Psychiatric evaluation was done.  Patient was assessed to lack capacity to make 

decisions.





Heart rate went down to 50s.  Digoxin and Lopressor were discontinued due to 

bradycardia.





Repeat SARS-CoV-2 PCR done on 05/21/2020 was positive.  Patient was eventually 

transferred to [place] to continue care.





FINAL DIAGNOSES: 


Severe sepsis


Right lower lobe pneumonia secondary to COVID-19


Acute hypoxemic respiratory failure


Thrombocytopenia


Acute kidney injury on chronic renal insufficiency


Acute Citrobacter UTI


Hypertensive urgency on admission


Dyslipidemia


Elevated liver transaminases


History of CVA with right hemiparesis


Elevated troponin due to non-ST elevated MI and renal failure


Suspected congestive heart failure, diastolic


Dysphagia


DNR/DNI status





DISPOSITION: Patient was transferred to [facility].





I have been assigned to complete a discharge summary on this account, I was not 

involved with the patient's management.--AURE Currie Jacqueline Robles NP May 24, 2020 22:37

## 2020-05-26 NOTE — NUR
*-* INSURANCE *-*



DISCHARGE SUMMARY HAS BEEN FAXED TO:



[employer] ([employer]) 

[phone]   Work

[phone]   Fax

[phone]   FAX

[phone]   FAX

## 2023-01-17 NOTE — NEPHROLOGY PROGRESS NOTE
Assessment/Plan


Problem List:  


(1) Renal failure (ARF), acute on chronic


(2) NSTEMI (non-ST elevated myocardial infarction)


(3) Right lower lobe pneumonia


(4) Suspected COVID-19 virus infection


(5) HTN (hypertension)


(6) UTI (urinary tract infection)


Assessment





Acute renal failure most likely superimposed on underlying chronic kidney 

disease


Acute hypoxic respiratory failure, pneumonia, suspected COVID-19


Sepsis


Elevated troponin


Suspected congestive heart failure


Hypertension, hypertension urgency on admission


Evidence of UTI


Plan





Suggestions:


Blood pressure out of control we are adjusting medication


Watch slow heart rate.


Change PRN blood pressure medication to minoxidil


Increase hydralazine dose


Monitor renal parameters


Continue rest





Previously:


Slow hydration


Watch for CHF symptoms


Pulmonary toilet


Antibiotics


Urine studies


Keep the blood pressure and blood sugar in check


Monitor renal parameters


Per orders


Per consultants





Subjective


ROS Limited/Unobtainable:  No


Constitutional:  Reports: malaise, weakness





Objective


Objective





Last 24 Hour Vital Signs








  Date Time  Temp Pulse Resp B/P (MAP) Pulse Ox O2 Delivery O2 Flow Rate FiO2


 


5/19/20 13:09    199/113    


 


5/19/20 12:00  67      


 


5/19/20 12:00 97.9 69 20 199/113 (141) 95   


 


5/19/20 11:22    199/113    


 


5/19/20 11:22    199/113    


 


5/19/20 09:22  55  156/104    


 


5/19/20 09:00  55  156/104    


 


5/19/20 09:00  55      


 


5/19/20 09:00      Nasal Cannula 4.0 


 


5/19/20 08:00 97.6 55 20 156/104 (121) 98   


 


5/19/20 08:00  57      


 


5/19/20 06:00 97.5 57 20 184/92 (122) 95   


 


5/19/20 05:33    183/82    


 


5/19/20 05:33    183/82    


 


5/19/20 04:00  79      


 


5/19/20 04:00 97.5 57 20 184/92 (122) 95   


 


5/19/20 00:11    159/76    


 


5/19/20 00:00 98.5 55 20 172/83 (112) 97   


 


5/18/20 21:44  59  159/76    


 


5/18/20 21:44    159/76    


 


5/18/20 21:00      Nasal Cannula 4.0 


 


5/18/20 20:00  57      


 


5/18/20 20:00 97.5 59 20 159/76 (103) 96   


 


5/18/20 17:12    148/73    


 


5/18/20 16:00 98.2 54 18 148/73 (98) 95   


 


5/18/20 16:00  71      


 


5/18/20 14:31    158/75    

















Intake and Output  


 


 5/18/20 5/19/20





 19:00 07:00


 


Intake Total 360 ml 


 


Output Total 600 ml 


 


Balance -240 ml 


 


  


 


Intake Oral 360 ml 


 


Output Urine Total 600 ml 


 


# Bowel Movements 1 








Laboratory Tests


5/19/20 04:30: 


White Blood Count 3.9L, Red Blood Count 4.82, Hemoglobin 15.3, Hematocrit 44.1, 

Mean Corpuscular Volume 91, Mean Corpuscular Hemoglobin 31.8H, Mean Corpuscular 

Hemoglobin Concent 34.7, Red Cell Distribution Width 11.1L, Platelet Count 131L

, Mean Platelet Volume 7.5, Neutrophils (%) (Auto) 73.9, Lymphocytes (%) (Auto) 

15.4L, Monocytes (%) (Auto) 10.1H, Eosinophils (%) (Auto) 0.1, Basophils (%) (

Auto) 0.6, Sodium Level 139, Potassium Level 4.4, Chloride Level 104, Carbon 

Dioxide Level 24, Anion Gap 11, Blood Urea Nitrogen 39H, Creatinine 1.7H, 

Estimat Glomerular Filtration Rate 47.3, Glucose Level 104, Calcium Level 9.0, 

Digoxin Level 1.1


Height (Feet):  5


Height (Inches):  9.00


Weight (Pounds):  160


General Appearance:  no apparent distress, lethargic


Cardiovascular:  normal rate, bradycardia


Respiratory/Chest:  decreased breath sounds


Abdomen:  distended











Shane Mello MD May 19, 2020 14:06 Problem: PAIN - ADULT  Goal: Verbalizes/displays adequate comfort level or baseline comfort level  Description: Interventions:  - Encourage patient to monitor pain and request assistance  - Assess pain using appropriate pain scale  - Administer analgesics based on type and severity of pain and evaluate response  - Implement non-pharmacological measures as appropriate and evaluate response  - Consider cultural and social influences on pain and pain management  - Notify physician/advanced practitioner if interventions unsuccessful or patient reports new pain  Outcome: Progressing